# Patient Record
Sex: FEMALE | Race: WHITE | NOT HISPANIC OR LATINO | Employment: UNEMPLOYED | URBAN - METROPOLITAN AREA
[De-identification: names, ages, dates, MRNs, and addresses within clinical notes are randomized per-mention and may not be internally consistent; named-entity substitution may affect disease eponyms.]

---

## 2023-03-23 ENCOUNTER — TELEPHONE (OUTPATIENT)
Dept: PRIMARY CARE | Facility: CLINIC | Age: 78
End: 2023-03-23
Payer: MEDICARE

## 2023-03-23 NOTE — TELEPHONE ENCOUNTER
Pt informed of results.  Does not want to take Alendronate at this time.    ----- Message from Juliana Rainey PA-C sent at 3/17/2023  5:18 PM EDT -----  Regarding: DEXA results  DEXA bone scan shows osteoporosis. Her FRAX 10 year fracture risk is elevated above normal. This qualifies her for treatment with prescription medication, Alendronate, which she would take weekly for a total of 5 years. This medication both treats and prevents osteoporosis. Please let me know if she is interested in this medication and I will call it into the pharmacy for her.   Regardless, she needs to take calcium 600mg + vitamin D 400U 2x daily and participate in weight bearing exercises as tolerated  Thanks

## 2023-06-05 ENCOUNTER — TELEPHONE (OUTPATIENT)
Dept: PRIMARY CARE | Facility: CLINIC | Age: 78
End: 2023-06-05
Payer: MEDICARE

## 2023-06-05 DIAGNOSIS — R35.0 URINARY FREQUENCY: Primary | ICD-10-CM

## 2023-06-06 ENCOUNTER — LAB (OUTPATIENT)
Dept: LAB | Facility: LAB | Age: 78
End: 2023-06-06
Payer: MEDICARE

## 2023-06-06 DIAGNOSIS — R35.0 URINARY FREQUENCY: ICD-10-CM

## 2023-06-06 LAB
APPEARANCE, URINE: CLEAR
BILIRUBIN, URINE: NEGATIVE
BLOOD, URINE: NEGATIVE
COLOR, URINE: YELLOW
GLUCOSE, URINE: NEGATIVE MG/DL
KETONES, URINE: NEGATIVE MG/DL
LEUKOCYTE ESTERASE, URINE: NEGATIVE
NITRITE, URINE: NEGATIVE
PH, URINE: 5 (ref 5–8)
PROTEIN, URINE: NEGATIVE MG/DL
SPECIFIC GRAVITY, URINE: 1.02 (ref 1–1.03)
UROBILINOGEN, URINE: <2 MG/DL (ref 0–1.9)

## 2023-06-06 PROCEDURE — 81003 URINALYSIS AUTO W/O SCOPE: CPT

## 2023-06-06 PROCEDURE — 87086 URINE CULTURE/COLONY COUNT: CPT

## 2023-06-07 LAB — URINE CULTURE: NORMAL

## 2023-06-08 ENCOUNTER — TELEPHONE (OUTPATIENT)
Dept: PRIMARY CARE | Facility: CLINIC | Age: 78
End: 2023-06-08
Payer: MEDICARE

## 2023-06-08 NOTE — TELEPHONE ENCOUNTER
----- Message from Juliana Rainey PA-C sent at 6/7/2023 10:21 AM EDT -----  Urine was negative for UTI. No antibiotics needed. Drink adequate water.

## 2023-08-22 LAB
ALANINE AMINOTRANSFERASE (SGPT) (U/L) IN SER/PLAS: 16 U/L (ref 7–45)
ALBUMIN (G/DL) IN SER/PLAS: 4.3 G/DL (ref 3.4–5)
ALKALINE PHOSPHATASE (U/L) IN SER/PLAS: 67 U/L (ref 33–136)
ANION GAP IN SER/PLAS: 13 MMOL/L (ref 10–20)
ASPARTATE AMINOTRANSFERASE (SGOT) (U/L) IN SER/PLAS: 16 U/L (ref 9–39)
BASOPHILS (10*3/UL) IN BLOOD BY AUTOMATED COUNT: 0.03 X10E9/L (ref 0–0.1)
BASOPHILS/100 LEUKOCYTES IN BLOOD BY AUTOMATED COUNT: 0.8 % (ref 0–2)
BILIRUBIN TOTAL (MG/DL) IN SER/PLAS: 0.8 MG/DL (ref 0–1.2)
CALCIDIOL (25 OH VITAMIN D3) (NG/ML) IN SER/PLAS: 75 NG/ML
CALCIUM (MG/DL) IN SER/PLAS: 10 MG/DL (ref 8.6–10.6)
CARBON DIOXIDE, TOTAL (MMOL/L) IN SER/PLAS: 28 MMOL/L (ref 21–32)
CHLORIDE (MMOL/L) IN SER/PLAS: 105 MMOL/L (ref 98–107)
CHOLESTEROL (MG/DL) IN SER/PLAS: 173 MG/DL (ref 0–199)
CHOLESTEROL IN HDL (MG/DL) IN SER/PLAS: 62.4 MG/DL
CHOLESTEROL/HDL RATIO: 2.8
CREATININE (MG/DL) IN SER/PLAS: 0.79 MG/DL (ref 0.5–1.05)
EOSINOPHILS (10*3/UL) IN BLOOD BY AUTOMATED COUNT: 0.09 X10E9/L (ref 0–0.4)
EOSINOPHILS/100 LEUKOCYTES IN BLOOD BY AUTOMATED COUNT: 2.3 % (ref 0–6)
ERYTHROCYTE DISTRIBUTION WIDTH (RATIO) BY AUTOMATED COUNT: 12.5 % (ref 11.5–14.5)
ERYTHROCYTE MEAN CORPUSCULAR HEMOGLOBIN CONCENTRATION (G/DL) BY AUTOMATED: 32.7 G/DL (ref 32–36)
ERYTHROCYTE MEAN CORPUSCULAR VOLUME (FL) BY AUTOMATED COUNT: 91 FL (ref 80–100)
ERYTHROCYTES (10*6/UL) IN BLOOD BY AUTOMATED COUNT: 4.4 X10E12/L (ref 4–5.2)
ESTIMATED AVERAGE GLUCOSE FOR HBA1C: 111 MG/DL
GFR FEMALE: 77 ML/MIN/1.73M2
GLUCOSE (MG/DL) IN SER/PLAS: 87 MG/DL (ref 74–99)
HEMATOCRIT (%) IN BLOOD BY AUTOMATED COUNT: 40.1 % (ref 36–46)
HEMOGLOBIN (G/DL) IN BLOOD: 13.1 G/DL (ref 12–16)
HEMOGLOBIN A1C/HEMOGLOBIN TOTAL IN BLOOD: 5.5 %
IMMATURE GRANULOCYTES/100 LEUKOCYTES IN BLOOD BY AUTOMATED COUNT: 0.3 % (ref 0–0.9)
LDL: 91 MG/DL (ref 0–99)
LEUKOCYTES (10*3/UL) IN BLOOD BY AUTOMATED COUNT: 3.9 X10E9/L (ref 4.4–11.3)
LYMPHOCYTES (10*3/UL) IN BLOOD BY AUTOMATED COUNT: 0.72 X10E9/L (ref 0.8–3)
LYMPHOCYTES/100 LEUKOCYTES IN BLOOD BY AUTOMATED COUNT: 18.4 % (ref 13–44)
MONOCYTES (10*3/UL) IN BLOOD BY AUTOMATED COUNT: 0.36 X10E9/L (ref 0.05–0.8)
MONOCYTES/100 LEUKOCYTES IN BLOOD BY AUTOMATED COUNT: 9.2 % (ref 2–10)
NEUTROPHILS (10*3/UL) IN BLOOD BY AUTOMATED COUNT: 2.71 X10E9/L (ref 1.6–5.5)
NEUTROPHILS/100 LEUKOCYTES IN BLOOD BY AUTOMATED COUNT: 69 % (ref 40–80)
NRBC (PER 100 WBCS) BY AUTOMATED COUNT: 0 /100 WBC (ref 0–0)
PLATELETS (10*3/UL) IN BLOOD AUTOMATED COUNT: 167 X10E9/L (ref 150–450)
POTASSIUM (MMOL/L) IN SER/PLAS: 4.5 MMOL/L (ref 3.5–5.3)
PROTEIN TOTAL: 7.3 G/DL (ref 6.4–8.2)
SODIUM (MMOL/L) IN SER/PLAS: 141 MMOL/L (ref 136–145)
THYROTROPIN (MIU/L) IN SER/PLAS BY DETECTION LIMIT <= 0.05 MIU/L: 2.64 MIU/L (ref 0.44–3.98)
TRIGLYCERIDE (MG/DL) IN SER/PLAS: 100 MG/DL (ref 0–149)
UREA NITROGEN (MG/DL) IN SER/PLAS: 18 MG/DL (ref 6–23)
VLDL: 20 MG/DL (ref 0–40)

## 2023-08-28 ENCOUNTER — OFFICE VISIT (OUTPATIENT)
Dept: PRIMARY CARE | Facility: CLINIC | Age: 78
End: 2023-08-28
Payer: MEDICARE

## 2023-08-28 VITALS
BODY MASS INDEX: 17.81 KG/M2 | OXYGEN SATURATION: 97 % | SYSTOLIC BLOOD PRESSURE: 116 MMHG | HEART RATE: 61 BPM | DIASTOLIC BLOOD PRESSURE: 75 MMHG | WEIGHT: 107 LBS

## 2023-08-28 DIAGNOSIS — I42.9 CARDIOMYOPATHY, UNSPECIFIED TYPE (MULTI): ICD-10-CM

## 2023-08-28 DIAGNOSIS — D72.819 CHRONIC LEUKOPENIA: ICD-10-CM

## 2023-08-28 DIAGNOSIS — E21.0 PRIMARY HYPERPARATHYROIDISM (MULTI): ICD-10-CM

## 2023-08-28 DIAGNOSIS — G89.29 CHRONIC PAIN OF LEFT KNEE: Primary | ICD-10-CM

## 2023-08-28 DIAGNOSIS — M25.562 CHRONIC PAIN OF LEFT KNEE: Primary | ICD-10-CM

## 2023-08-28 PROBLEM — M25.512 LEFT SHOULDER PAIN: Status: ACTIVE | Noted: 2023-08-28

## 2023-08-28 PROBLEM — R94.31 ABNORMAL EKG: Status: ACTIVE | Noted: 2023-08-28

## 2023-08-28 PROBLEM — L03.90 CELLULITIS: Status: ACTIVE | Noted: 2023-08-28

## 2023-08-28 PROBLEM — M77.42 METATARSALGIA OF BOTH FEET: Status: ACTIVE | Noted: 2023-08-28

## 2023-08-28 PROBLEM — R53.83 FATIGUE: Status: ACTIVE | Noted: 2023-08-28

## 2023-08-28 PROBLEM — S93.409A ANKLE SPRAIN: Status: ACTIVE | Noted: 2023-08-28

## 2023-08-28 PROBLEM — M17.10 ARTHRITIS OF KNEE: Status: ACTIVE | Noted: 2023-08-28

## 2023-08-28 PROBLEM — E89.2 S/P PARATHYROIDECTOMY (CMS/HCC): Status: ACTIVE | Noted: 2023-08-28

## 2023-08-28 PROBLEM — S22.39XA FRACTURE OF RIB, SINGLE, CLOSED: Status: ACTIVE | Noted: 2023-08-28

## 2023-08-28 PROBLEM — R50.9 FEVER: Status: ACTIVE | Noted: 2023-08-28

## 2023-08-28 PROBLEM — E83.52 HYPERCALCEMIA: Status: ACTIVE | Noted: 2023-08-28

## 2023-08-28 PROBLEM — N95.1 POSTMENOPAUSAL DISORDER: Status: ACTIVE | Noted: 2023-08-28

## 2023-08-28 PROBLEM — H43.812 PVD (POSTERIOR VITREOUS DETACHMENT), LEFT EYE: Status: ACTIVE | Noted: 2023-08-28

## 2023-08-28 PROBLEM — H04.123 DRY EYES, BILATERAL: Status: ACTIVE | Noted: 2023-08-28

## 2023-08-28 PROBLEM — E78.5 HYPERLIPIDEMIA: Status: ACTIVE | Noted: 2023-08-28

## 2023-08-28 PROBLEM — H43.811 PVD (POSTERIOR VITREOUS DETACHMENT), RIGHT EYE: Status: ACTIVE | Noted: 2023-08-28

## 2023-08-28 PROBLEM — M81.0 OSTEOPOROSIS: Status: ACTIVE | Noted: 2023-08-28

## 2023-08-28 PROBLEM — I49.3 PVC (PREMATURE VENTRICULAR CONTRACTION): Status: ACTIVE | Noted: 2023-08-28

## 2023-08-28 PROBLEM — H25.811 COMBINED FORM OF AGE-RELATED CATARACT, RIGHT EYE: Status: ACTIVE | Noted: 2023-08-28

## 2023-08-28 PROBLEM — M54.2 NECK PAIN: Status: ACTIVE | Noted: 2023-08-28

## 2023-08-28 PROBLEM — R79.9 ABNORMAL BLOOD CHEMISTRY: Status: ACTIVE | Noted: 2023-08-28

## 2023-08-28 PROBLEM — M54.9 BACK PAIN: Status: ACTIVE | Noted: 2023-08-28

## 2023-08-28 PROBLEM — M25.579 ANKLE PAIN: Status: ACTIVE | Noted: 2023-08-28

## 2023-08-28 PROBLEM — M62.838 MUSCLE SPASM: Status: ACTIVE | Noted: 2023-08-28

## 2023-08-28 PROBLEM — L03.90 CELLULITIS: Status: RESOLVED | Noted: 2023-08-28 | Resolved: 2023-08-28

## 2023-08-28 PROBLEM — M20.10 VALGUS DEFORMITY OF GREAT TOE: Status: ACTIVE | Noted: 2023-08-28

## 2023-08-28 PROBLEM — M77.41 METATARSALGIA OF BOTH FEET: Status: ACTIVE | Noted: 2023-08-28

## 2023-08-28 PROBLEM — M54.16 LUMBAR RADICULITIS: Status: ACTIVE | Noted: 2023-08-28

## 2023-08-28 PROBLEM — H91.90 HEARING LOSS: Status: ACTIVE | Noted: 2023-08-28

## 2023-08-28 PROBLEM — H25.812 COMBINED FORM OF AGE-RELATED CATARACT, LEFT EYE: Status: ACTIVE | Noted: 2023-08-28

## 2023-08-28 PROBLEM — H52.00 HYPEROPIA WITH PRESBYOPIA: Status: ACTIVE | Noted: 2023-08-28

## 2023-08-28 PROBLEM — M65.312 TRIGGER FINGER OF LEFT THUMB: Status: ACTIVE | Noted: 2023-08-28

## 2023-08-28 PROBLEM — H01.003 BLEPHARITIS OF BOTH EYES: Status: ACTIVE | Noted: 2023-08-28

## 2023-08-28 PROBLEM — H52.4 HYPEROPIA WITH PRESBYOPIA: Status: ACTIVE | Noted: 2023-08-28

## 2023-08-28 PROBLEM — H01.006 BLEPHARITIS OF BOTH EYES: Status: ACTIVE | Noted: 2023-08-28

## 2023-08-28 PROCEDURE — 1159F MED LIST DOCD IN RCRD: CPT | Performed by: PHYSICIAN ASSISTANT

## 2023-08-28 PROCEDURE — 1036F TOBACCO NON-USER: CPT | Performed by: PHYSICIAN ASSISTANT

## 2023-08-28 PROCEDURE — 1160F RVW MEDS BY RX/DR IN RCRD: CPT | Performed by: PHYSICIAN ASSISTANT

## 2023-08-28 PROCEDURE — 99213 OFFICE O/P EST LOW 20 MIN: CPT | Performed by: PHYSICIAN ASSISTANT

## 2023-08-28 RX ORDER — ATORVASTATIN CALCIUM 10 MG/1
10 TABLET, FILM COATED ORAL DAILY
COMMUNITY
End: 2024-02-12 | Stop reason: SDUPTHER

## 2023-08-28 RX ORDER — IBUPROFEN 200 MG
TABLET ORAL EVERY 6 HOURS
COMMUNITY

## 2023-08-28 RX ORDER — ASPIRIN 81 MG/1
TABLET ORAL
COMMUNITY

## 2023-08-28 RX ORDER — ASCORBIC ACID
CRYSTALS ORAL
COMMUNITY

## 2023-08-28 RX ORDER — SULFAMETHOXAZOLE AND TRIMETHOPRIM 800; 160 MG/1; MG/1
TABLET ORAL EVERY 12 HOURS
COMMUNITY
Start: 2014-05-21 | End: 2023-08-28 | Stop reason: ALTCHOICE

## 2023-08-28 RX ORDER — MULTIVIT WITH IRON,MINERALS
TABLET ORAL
COMMUNITY
End: 2024-02-12 | Stop reason: ALTCHOICE

## 2023-08-28 RX ORDER — MULTIVITAMIN
TABLET ORAL
COMMUNITY

## 2023-08-28 ASSESSMENT — PATIENT HEALTH QUESTIONNAIRE - PHQ9
SUM OF ALL RESPONSES TO PHQ9 QUESTIONS 1 AND 2: 0
2. FEELING DOWN, DEPRESSED OR HOPELESS: NOT AT ALL
1. LITTLE INTEREST OR PLEASURE IN DOING THINGS: NOT AT ALL

## 2023-08-28 NOTE — ASSESSMENT & PLAN NOTE
S/p physical therapy with little to no relief.  RICE therapy encouraged.  Take Tylenol arthritis as needed for pain.  Referral to orthopedics placed for further management.

## 2023-08-28 NOTE — ASSESSMENT & PLAN NOTE
Stable on current meds.  Follows with cardiology.  Follow heart healthy lifestyle through diet and exercise.

## 2023-08-28 NOTE — PROGRESS NOTES
"Subjective   Lydia Limon is a 78 y.o. female who presents for Follow-up.  HPI Lydia Limon is a 78 y.o. female presenting for a follow up. Generally doing well. Currently c/o:    Chronic L knee pain: Recall XR 2021 showed \"severe medial and mild lateral/patellofemoral compartment osteoarthrosis. Moderate knee joint effusion. \" She is s/p completion of physical therapy, felt little to no benefit. She is interested in meeting with Orthopedics to discuss next step treatment.     HLD, cardiomyopathy: stable on 81mg ASA, atorvastatin 10mg, and niacin 100mg  Follows with cardiology. Denies HA, dizziness, chest pain, SOB/TAN, LE edema    Osteoporosis: DEXA 3/2023 showed osteoporosis. Refuses bisphosphonate therapy.     Chronic leukopenia, lymphopenia: has never been evaluated by hematology. No recurrent or chronic infections    Health maintenance:  Immunizations:  -Flu: refused  -Pneumococcal: Patient is contemplating, deferred.   -Shingrix: refused  Mammogram w/ jm UTD (last 5/23/23) - ordered next   Colon CA screening - refused   DEXA DUE (last 3/15/23) - refused bisphosphonate    Last MCR: 2/20/23 (plain MCR)    12 point ROS reviewed and negative other than as stated in HPI    /75   Pulse 61   Wt 48.5 kg (107 lb)   SpO2 97%   BMI 17.81 kg/m²   Objective   Physical Exam  Vitals reviewed.   Constitutional:       General: She is not in acute distress.     Appearance: Normal appearance. She is not ill-appearing.   HENT:      Head: Normocephalic and atraumatic.   Eyes:      General: No scleral icterus.     Extraocular Movements: Extraocular movements intact.      Conjunctiva/sclera: Conjunctivae normal.      Pupils: Pupils are equal, round, and reactive to light.   Cardiovascular:      Rate and Rhythm: Normal rate and regular rhythm.      Heart sounds: Normal heart sounds. No murmur heard.     No friction rub. No gallop.   Pulmonary:      Effort: Pulmonary effort is normal. No respiratory distress. "      Breath sounds: Normal breath sounds. No stridor. No wheezing, rhonchi or rales.   Musculoskeletal:      Cervical back: Normal range of motion.      Right lower leg: No edema.      Left lower leg: No edema.   Skin:     General: Skin is warm and dry.   Neurological:      Mental Status: She is alert and oriented to person, place, and time. Mental status is at baseline.      Cranial Nerves: No cranial nerve deficit.      Gait: Gait normal.   Psychiatric:         Mood and Affect: Mood normal.         Behavior: Behavior normal.         Assessment/Plan   Problem List Items Addressed This Visit       Cardiomyopathy (CMS/HCC)     Stable on current meds.  Follows with cardiology.  Follow heart healthy lifestyle through diet and exercise.         Left knee pain - Primary     S/p physical therapy with little to no relief.  RICE therapy encouraged.  Take Tylenol arthritis as needed for pain.  Referral to orthopedics placed for further management.         Relevant Orders    Referral to Orthopaedic Surgery    Primary hyperparathyroidism (CMS/HCC)     S/p parathyroidectomy 2019.          Chronic leukopenia     Without any evidence of infection.  Referral to benign hematology placed for further work-up         Relevant Orders    Referral to Hematology        Follow-up in 6 months for Medicare wellness or sooner as needed

## 2023-11-16 ENCOUNTER — ANCILLARY PROCEDURE (OUTPATIENT)
Dept: RADIOLOGY | Facility: CLINIC | Age: 78
End: 2023-11-16
Payer: MEDICARE

## 2023-11-16 ENCOUNTER — OFFICE VISIT (OUTPATIENT)
Dept: ORTHOPEDIC SURGERY | Facility: CLINIC | Age: 78
End: 2023-11-16
Payer: MEDICARE

## 2023-11-16 DIAGNOSIS — M25.562 LEFT KNEE PAIN, UNSPECIFIED CHRONICITY: ICD-10-CM

## 2023-11-16 DIAGNOSIS — M17.12 PRIMARY OSTEOARTHRITIS OF LEFT KNEE: Primary | ICD-10-CM

## 2023-11-16 PROCEDURE — 1125F AMNT PAIN NOTED PAIN PRSNT: CPT | Performed by: ORTHOPAEDIC SURGERY

## 2023-11-16 PROCEDURE — 99203 OFFICE O/P NEW LOW 30 MIN: CPT | Performed by: ORTHOPAEDIC SURGERY

## 2023-11-16 PROCEDURE — 2500000005 HC RX 250 GENERAL PHARMACY W/O HCPCS: Performed by: ORTHOPAEDIC SURGERY

## 2023-11-16 PROCEDURE — 99213 OFFICE O/P EST LOW 20 MIN: CPT | Performed by: ORTHOPAEDIC SURGERY

## 2023-11-16 PROCEDURE — 1160F RVW MEDS BY RX/DR IN RCRD: CPT | Performed by: ORTHOPAEDIC SURGERY

## 2023-11-16 PROCEDURE — 2500000004 HC RX 250 GENERAL PHARMACY W/ HCPCS (ALT 636 FOR OP/ED): Performed by: ORTHOPAEDIC SURGERY

## 2023-11-16 PROCEDURE — 73564 X-RAY EXAM KNEE 4 OR MORE: CPT | Mod: LEFT SIDE | Performed by: STUDENT IN AN ORGANIZED HEALTH CARE EDUCATION/TRAINING PROGRAM

## 2023-11-16 PROCEDURE — 73564 X-RAY EXAM KNEE 4 OR MORE: CPT | Mod: LT,FY

## 2023-11-16 PROCEDURE — 1159F MED LIST DOCD IN RCRD: CPT | Performed by: ORTHOPAEDIC SURGERY

## 2023-11-16 PROCEDURE — 20610 DRAIN/INJ JOINT/BURSA W/O US: CPT | Performed by: ORTHOPAEDIC SURGERY

## 2023-11-16 RX ORDER — LIDOCAINE HYDROCHLORIDE 10 MG/ML
4 INJECTION INFILTRATION; PERINEURAL
Status: COMPLETED | OUTPATIENT
Start: 2023-11-16 | End: 2023-11-16

## 2023-11-16 RX ORDER — TRIAMCINOLONE ACETONIDE 40 MG/ML
1 INJECTION, SUSPENSION INTRA-ARTICULAR; INTRAMUSCULAR
Status: COMPLETED | OUTPATIENT
Start: 2023-11-16 | End: 2023-11-16

## 2023-11-16 RX ADMIN — TRIAMCINOLONE ACETONIDE 1 ML: 40 INJECTION, SUSPENSION INTRA-ARTICULAR; INTRAMUSCULAR at 12:40

## 2023-11-16 RX ADMIN — LIDOCAINE HYDROCHLORIDE 4 ML: 10 INJECTION, SOLUTION INFILTRATION; PERINEURAL at 12:40

## 2023-11-16 ASSESSMENT — PAIN SCALES - GENERAL: PAINLEVEL_OUTOF10: 3

## 2023-11-16 ASSESSMENT — PAIN - FUNCTIONAL ASSESSMENT: PAIN_FUNCTIONAL_ASSESSMENT: 0-10

## 2023-11-16 NOTE — PROGRESS NOTES
Patient is a pleasant 78-year-old female presents today for evaluation of left knee osteoarthritis.  She had previous physical therapy.  She takes Advil.  She is difficulty going down stairs or walking certain distance.  She is never any injections or previous surgery in the knee.    Left knee: AAOx3, NAD, walks with a [degree] antalgic gait  Varus allignment  Range of motion lacks 5 degrees of full extension and flexes to 110 degrees  Stable to varus/valgus/anterior/posterior stress through out the range of motion  Slight laxity with varus stress  Diffuse medial  joint line tenderness to palpation  Moderate effusion  SILT in a allison/saph/per/tib distribution  5/5 knee extension/df/pf/ehl  ½ dorsalis pedis and posterior tibial pulse  no popliteal lymphadenopathy  no other overlying lesions  mood: euthymic  Respirations non labored    Plain films were reviewed by myself in clinic today.  She is advanced osteoarthritis of her left knee evidenced by significant medial joint space narrowing, underlying sclerosis and tricompartmental osteophytic change.    We discussed further conservative treatment with her today in clinic.  She may benefit from total knee replacement the future.  She elected undergo a cortisone injection today in clinic.  She can continue to take over-the-counter anti-inflammatories.  We will see her back in 1 month and see how she is doing.  All of her questions were answered.    L Inj/Asp: L knee on 11/16/2023 12:40 PM  Indications: pain and joint swelling  Details: 22 G needle, anterolateral approach  Medications: 4 mL lidocaine 10 mg/mL (1 %); 1 mL triamcinolone acetonide 40 mg/mL    Discussion:  I discussed the conservative treatment options for knee osteoarthritis including but not limited to physical therapy, oral NSAIDS, activity and lifestyle modification, and corticosteroid injections. Pt has elected to undergo a cortisone injection today. I have explained the risk and benefits of an injection  including the possibility of joint infection, bleeding, damage to cartilage, allergic reaction. Patient verbalized understanding and gave verbal consent wishes to proceed with a intra-articular cortisone injection for their knee.    Procedure:  After discussing the risk and benefits of the procedure, we proceeded with an intra-articular left knee injection.    With the patient's informed verbal consent, the left knee was prepped in standard sterile fashion with Chlorhexidine. The skin was then anesthetized with ethyl chloride spray and cleaned again with Chlorhexidine. The knee was then apirated/injected with a prefilled 20-gauge syringe of 40 mg Kenalog + 4 ml Lidocaine using the lateral approach without complications.  The patient tolerated this well and felt immediate initial relief of symptoms. A bandaid was applied and the patient ambulated out of the clinic on ther own accord without difficulty. Patient was instructed to avoid physical activity for 24-48 hours to prevent the knees from swelling and may ice the knees as tolerated. Patient should contact the office if any signs of of infection appear: redness, fever, chills, drainage, swelling or warmth to the knees.  Pt understands that the injections can be repeated no sooner than 3 months.  Procedure, treatment alternatives, risks and benefits explained, specific risks discussed. Consent was given by the patient. Immediately prior to procedure a time out was called to verify the correct patient, procedure, equipment, support staff and site/side marked as required. Patient was prepped and draped in the usual sterile fashion.

## 2024-02-12 ENCOUNTER — OFFICE VISIT (OUTPATIENT)
Dept: CARDIOLOGY | Facility: CLINIC | Age: 79
End: 2024-02-12
Payer: MEDICARE

## 2024-02-12 ENCOUNTER — HOSPITAL ENCOUNTER (OUTPATIENT)
Dept: CARDIOLOGY | Facility: CLINIC | Age: 79
Discharge: HOME | End: 2024-02-12
Payer: MEDICARE

## 2024-02-12 VITALS
HEIGHT: 65 IN | OXYGEN SATURATION: 100 % | DIASTOLIC BLOOD PRESSURE: 80 MMHG | WEIGHT: 109.8 LBS | BODY MASS INDEX: 18.29 KG/M2 | SYSTOLIC BLOOD PRESSURE: 145 MMHG | HEART RATE: 64 BPM

## 2024-02-12 DIAGNOSIS — I42.9 PRIMARY CARDIOMYOPATHY (MULTI): Primary | ICD-10-CM

## 2024-02-12 DIAGNOSIS — E78.5 HYPERLIPIDEMIA, UNSPECIFIED HYPERLIPIDEMIA TYPE: ICD-10-CM

## 2024-02-12 LAB
ATRIAL RATE: 64 BPM
P AXIS: 76 DEGREES
P OFFSET: 200 MS
P ONSET: 139 MS
PR INTERVAL: 162 MS
Q ONSET: 220 MS
QRS COUNT: 10 BEATS
QRS DURATION: 104 MS
QT INTERVAL: 394 MS
QTC CALCULATION(BAZETT): 406 MS
QTC FREDERICIA: 402 MS
R AXIS: 24 DEGREES
T AXIS: 51 DEGREES
T OFFSET: 417 MS
VENTRICULAR RATE: 64 BPM

## 2024-02-12 PROCEDURE — 1126F AMNT PAIN NOTED NONE PRSNT: CPT | Performed by: INTERNAL MEDICINE

## 2024-02-12 PROCEDURE — 93005 ELECTROCARDIOGRAM TRACING: CPT

## 2024-02-12 PROCEDURE — 1159F MED LIST DOCD IN RCRD: CPT | Performed by: INTERNAL MEDICINE

## 2024-02-12 PROCEDURE — 1160F RVW MEDS BY RX/DR IN RCRD: CPT | Performed by: INTERNAL MEDICINE

## 2024-02-12 PROCEDURE — 93010 ELECTROCARDIOGRAM REPORT: CPT | Performed by: INTERNAL MEDICINE

## 2024-02-12 PROCEDURE — 1036F TOBACCO NON-USER: CPT | Performed by: INTERNAL MEDICINE

## 2024-02-12 PROCEDURE — 99213 OFFICE O/P EST LOW 20 MIN: CPT | Performed by: INTERNAL MEDICINE

## 2024-02-12 RX ORDER — ATORVASTATIN CALCIUM 20 MG/1
20 TABLET, FILM COATED ORAL DAILY
Qty: 90 TABLET | Refills: 3 | Status: SHIPPED | OUTPATIENT
Start: 2024-02-12 | End: 2024-02-14 | Stop reason: SDUPTHER

## 2024-02-12 RX ORDER — VITAMIN E MIXED 400 UNIT
400 CAPSULE ORAL DAILY
COMMUNITY

## 2024-02-12 RX ORDER — CALCIUM CARBONATE/VITAMIN D3 600 MG-20
2 TABLET,CHEWABLE ORAL DAILY
COMMUNITY

## 2024-02-12 ASSESSMENT — ENCOUNTER SYMPTOMS
OCCASIONAL FEELINGS OF UNSTEADINESS: 0
LOSS OF SENSATION IN FEET: 0
DEPRESSION: 0

## 2024-02-12 ASSESSMENT — PATIENT HEALTH QUESTIONNAIRE - PHQ9
SUM OF ALL RESPONSES TO PHQ9 QUESTIONS 1 AND 2: 0
1. LITTLE INTEREST OR PLEASURE IN DOING THINGS: NOT AT ALL
2. FEELING DOWN, DEPRESSED OR HOPELESS: NOT AT ALL

## 2024-02-12 ASSESSMENT — PAIN SCALES - GENERAL: PAINLEVEL: 0-NO PAIN

## 2024-02-12 NOTE — PROGRESS NOTES
Subjective:  Patient returns for a routine annual follow-up.  She generally remains asymptomatic from a cardiac standpoint.  Her blood pressure readings at home have all been excellent.  She remains compliant with her low-dose statin therapy.  She has not had any hospitalizations and denies any other new health concerns.    Objective:  General: Alert, usual slender self.  HEENT: Unchanged.  Lungs: Clear without crackles or wheezing.  Cardiac: Normal S1 and S2 without murmur rub or S3.  Abdomen: Nontender.  Extremities: No edema.  Skin: No rash.  Neuro: Grossly intact and unchanged.    EKG: Normal sinus rhythm.  LVH.  Nonspecific ST abnormality.    Lipid panel: Cholesterol-173, HDL-62, LDL-91, TG-100.    Impression/plan:  Lydia is doing quite nicely at this time.  She remains asymptomatic cardiac wise.  I did encourage her to escalate her atorvastatin dosing to 20 mg daily to see if we could get her LDL a bit better given the severity of her baseline LDL elevation.  She will give this a try and let me know if she tolerates it.  I will recheck her lipids in 3 months and make further recommendations at that time.  She knows to call for any intercurrent concerns.  I did not think we needed to embark on any other testing at this time.    Patient instructions:    Increase your atorvastatin to 20 mg daily and obtain repeat lipids in 3 months.  Call for results.    Return to clinic in 1 year.

## 2024-02-14 ENCOUNTER — TELEPHONE (OUTPATIENT)
Dept: CARDIOLOGY | Facility: CLINIC | Age: 79
End: 2024-02-14
Payer: MEDICARE

## 2024-02-14 DIAGNOSIS — E78.5 HYPERLIPIDEMIA, UNSPECIFIED HYPERLIPIDEMIA TYPE: Primary | ICD-10-CM

## 2024-02-14 RX ORDER — ATORVASTATIN CALCIUM 10 MG/1
10 TABLET, FILM COATED ORAL DAILY
Qty: 90 TABLET | Refills: 3 | Status: SHIPPED | OUTPATIENT
Start: 2024-02-14 | End: 2024-02-15 | Stop reason: SDUPTHER

## 2024-02-15 DIAGNOSIS — E78.5 HYPERLIPIDEMIA, UNSPECIFIED HYPERLIPIDEMIA TYPE: ICD-10-CM

## 2024-02-15 RX ORDER — ATORVASTATIN CALCIUM 10 MG/1
10 TABLET, FILM COATED ORAL DAILY
Qty: 90 TABLET | Refills: 3 | Status: SHIPPED | OUTPATIENT
Start: 2024-02-15 | End: 2025-02-14

## 2024-02-16 ENCOUNTER — OFFICE VISIT (OUTPATIENT)
Dept: ORTHOPEDIC SURGERY | Facility: CLINIC | Age: 79
End: 2024-02-16
Payer: MEDICARE

## 2024-02-16 DIAGNOSIS — M25.562 LEFT KNEE PAIN, UNSPECIFIED CHRONICITY: ICD-10-CM

## 2024-02-16 DIAGNOSIS — M17.0 PRIMARY OSTEOARTHRITIS OF BOTH KNEES: Primary | ICD-10-CM

## 2024-02-16 DIAGNOSIS — M25.561 RIGHT KNEE PAIN, UNSPECIFIED CHRONICITY: ICD-10-CM

## 2024-02-16 PROCEDURE — 1159F MED LIST DOCD IN RCRD: CPT | Performed by: ORTHOPAEDIC SURGERY

## 2024-02-16 PROCEDURE — 1126F AMNT PAIN NOTED NONE PRSNT: CPT | Performed by: ORTHOPAEDIC SURGERY

## 2024-02-16 PROCEDURE — 1160F RVW MEDS BY RX/DR IN RCRD: CPT | Performed by: ORTHOPAEDIC SURGERY

## 2024-02-16 PROCEDURE — 20610 DRAIN/INJ JOINT/BURSA W/O US: CPT | Mod: 50 | Performed by: ORTHOPAEDIC SURGERY

## 2024-02-16 PROCEDURE — 20610 DRAIN/INJ JOINT/BURSA W/O US: CPT | Performed by: ORTHOPAEDIC SURGERY

## 2024-02-16 PROCEDURE — 1036F TOBACCO NON-USER: CPT | Performed by: ORTHOPAEDIC SURGERY

## 2024-02-16 PROCEDURE — 2500000004 HC RX 250 GENERAL PHARMACY W/ HCPCS (ALT 636 FOR OP/ED): Performed by: ORTHOPAEDIC SURGERY

## 2024-02-16 PROCEDURE — 2500000005 HC RX 250 GENERAL PHARMACY W/O HCPCS: Performed by: ORTHOPAEDIC SURGERY

## 2024-02-16 RX ORDER — LIDOCAINE HYDROCHLORIDE 10 MG/ML
4 INJECTION INFILTRATION; PERINEURAL
Status: COMPLETED | OUTPATIENT
Start: 2024-02-16 | End: 2024-02-16

## 2024-02-16 RX ORDER — TRIAMCINOLONE ACETONIDE 40 MG/ML
1 INJECTION, SUSPENSION INTRA-ARTICULAR; INTRAMUSCULAR
Status: COMPLETED | OUTPATIENT
Start: 2024-02-16 | End: 2024-02-16

## 2024-02-16 RX ADMIN — LIDOCAINE HYDROCHLORIDE 4 ML: 10 INJECTION, SOLUTION INFILTRATION; PERINEURAL at 13:58

## 2024-02-16 RX ADMIN — TRIAMCINOLONE ACETONIDE 1 ML: 40 INJECTION, SUSPENSION INTRA-ARTICULAR; INTRAMUSCULAR at 13:58

## 2024-02-16 ASSESSMENT — PAIN SCALES - GENERAL: PAINLEVEL_OUTOF10: 6

## 2024-02-16 ASSESSMENT — PAIN DESCRIPTION - DESCRIPTORS: DESCRIPTORS: ACHING;SHARP

## 2024-02-16 ASSESSMENT — PAIN - FUNCTIONAL ASSESSMENT: PAIN_FUNCTIONAL_ASSESSMENT: 0-10

## 2024-02-16 NOTE — PROGRESS NOTES
L Inj/Asp: bilateral knee on 2/16/2024 1:58 PM  Indications: pain and joint swelling  Details: 22 G needle, anterolateral approach  Medications (Right): 1 mL triamcinolone acetonide 40 mg/mL; 4 mL lidocaine 10 mg/mL (1 %)  Medications (Left): 1 mL triamcinolone acetonide 40 mg/mL; 4 mL lidocaine 10 mg/mL (1 %)    Discussion:  I discussed the conservative treatment options for knee osteoarthritis including but not limited to physical therapy, oral NSAIDS, activity and lifestyle modification, and corticosteroid injections. Pt has elected to undergo a cortisone injection today. I have explained the risk and benefits of an injection including the possibility of joint infection, bleeding, damage to cartilage, allergic reaction. Patient verbalized understanding and gave verbal consent wishes to proceed with a intra-articular cortisone injection for their knee.    Procedure:  After discussing the risk and benefits of the procedure, we proceeded with an intra-articular bilateral knee injection.    With the patient's informed verbal consent, the bilateral knees were prepped in standard sterile fashion with Chlorhexidine. The skin was then anesthetized with ethyl chloride spray and cleaned again with Chlorhexidine. The bilateral knees were then apirated/injected with a prefilled 20-gauge syringe of 40 mg Kenalog + 4 ml Lidocaine using the lateral approach without complications.  The patient tolerated this well and felt immediate initial relief of symptoms. A bandaid was applied and the patient ambulated out of the clinic on ther own accord without difficulty. Patient was instructed to avoid physical activity for 24-48 hours to prevent the knees from swelling and may ice the knees as tolerated. Patient should contact the office if any signs of of infection appear: redness, fever, chills, drainage, swelling or warmth to the knees.  Pt understands that the injections can be repeated no sooner than 3 months.      Procedure,  treatment alternatives, risks and benefits explained, specific risks discussed. Consent was given by the patient. Immediately prior to procedure a time out was called to verify the correct patient, procedure, equipment, support staff and site/side marked as required. Patient was prepped and draped in the usual sterile fashion.

## 2024-02-29 ENCOUNTER — OFFICE VISIT (OUTPATIENT)
Dept: PRIMARY CARE | Facility: CLINIC | Age: 79
End: 2024-02-29
Payer: MEDICARE

## 2024-02-29 VITALS
DIASTOLIC BLOOD PRESSURE: 74 MMHG | OXYGEN SATURATION: 99 % | BODY MASS INDEX: 18.14 KG/M2 | SYSTOLIC BLOOD PRESSURE: 131 MMHG | HEART RATE: 66 BPM | WEIGHT: 109 LBS

## 2024-02-29 DIAGNOSIS — Z13.89 ENCOUNTER FOR SCREENING FOR OTHER DISORDER: ICD-10-CM

## 2024-02-29 DIAGNOSIS — Z00.00 MEDICARE ANNUAL WELLNESS VISIT, SUBSEQUENT: Primary | ICD-10-CM

## 2024-02-29 DIAGNOSIS — M17.10 ARTHRITIS OF KNEE: ICD-10-CM

## 2024-02-29 DIAGNOSIS — Z12.31 ENCOUNTER FOR SCREENING MAMMOGRAM FOR BREAST CANCER: ICD-10-CM

## 2024-02-29 DIAGNOSIS — E78.5 HYPERLIPIDEMIA, UNSPECIFIED HYPERLIPIDEMIA TYPE: ICD-10-CM

## 2024-02-29 DIAGNOSIS — I42.9 CARDIOMYOPATHY, UNSPECIFIED TYPE (MULTI): ICD-10-CM

## 2024-02-29 PROBLEM — M25.579 ANKLE PAIN: Status: RESOLVED | Noted: 2023-08-28 | Resolved: 2024-02-29

## 2024-02-29 PROBLEM — S93.409A ANKLE SPRAIN: Status: RESOLVED | Noted: 2023-08-28 | Resolved: 2024-02-29

## 2024-02-29 PROBLEM — E21.0 PRIMARY HYPERPARATHYROIDISM (MULTI): Status: RESOLVED | Noted: 2023-08-28 | Resolved: 2024-02-29

## 2024-02-29 PROCEDURE — 99213 OFFICE O/P EST LOW 20 MIN: CPT | Performed by: PHYSICIAN ASSISTANT

## 2024-02-29 PROCEDURE — 1124F ACP DISCUSS-NO DSCNMKR DOCD: CPT | Performed by: PHYSICIAN ASSISTANT

## 2024-02-29 PROCEDURE — 1160F RVW MEDS BY RX/DR IN RCRD: CPT | Performed by: PHYSICIAN ASSISTANT

## 2024-02-29 PROCEDURE — 1159F MED LIST DOCD IN RCRD: CPT | Performed by: PHYSICIAN ASSISTANT

## 2024-02-29 PROCEDURE — 1126F AMNT PAIN NOTED NONE PRSNT: CPT | Performed by: PHYSICIAN ASSISTANT

## 2024-02-29 PROCEDURE — 1170F FXNL STATUS ASSESSED: CPT | Performed by: PHYSICIAN ASSISTANT

## 2024-02-29 PROCEDURE — G0439 PPPS, SUBSEQ VISIT: HCPCS | Performed by: PHYSICIAN ASSISTANT

## 2024-02-29 PROCEDURE — 1036F TOBACCO NON-USER: CPT | Performed by: PHYSICIAN ASSISTANT

## 2024-02-29 ASSESSMENT — PATIENT HEALTH QUESTIONNAIRE - PHQ9
2. FEELING DOWN, DEPRESSED OR HOPELESS: NOT AT ALL
SUM OF ALL RESPONSES TO PHQ9 QUESTIONS 1 AND 2: 0
1. LITTLE INTEREST OR PLEASURE IN DOING THINGS: NOT AT ALL

## 2024-02-29 ASSESSMENT — ENCOUNTER SYMPTOMS
LOSS OF SENSATION IN FEET: 0
OCCASIONAL FEELINGS OF UNSTEADINESS: 0
DEPRESSION: 0

## 2024-02-29 ASSESSMENT — ACTIVITIES OF DAILY LIVING (ADL)
GROCERY_SHOPPING: INDEPENDENT
MANAGING_FINANCES: INDEPENDENT
TAKING_MEDICATION: INDEPENDENT
DOING_HOUSEWORK: INDEPENDENT
BATHING: INDEPENDENT

## 2024-02-29 NOTE — ASSESSMENT & PLAN NOTE
Continue following with ortho for cortisone injections every 3 months. Take tylenol arthritis PRN for pain.

## 2024-02-29 NOTE — PROGRESS NOTES
Subjective   Lydia Limon is a 78 y.o. female who presents for a Medicare Annual Wellness exam. Generally doing well. No new complaints.     HLD, cardiomyopathy: stable on 81mg ASA, atorvastatin 10mg, and niacin 100mg  Does not check BP at home. Denies HA, dizziness, chest pain, SOB/TAN, LE edema. Follows with cardiology. ASCVD 10 yr risk 18.5%    Osteoporosis: DEXA 3/2023 showed osteoporosis. Refuses bisphosphonate therapy.     Chronic leukopenia, lymphopenia: No recurrent or chronic infections. Does not follow with hematology.     B/l knee pains: chronic. Follows with orthopedics. S/p b/l cortisone injections.     Health maintenance:  Immunizations:  -Flu: refused  -Pneumococcal: deferred by pt  -Shingrix: refused  Mammogram w/ jm UTD (last 5/23/23) - ordered next 2/29/24  Colon CA screening - refused   DEXA DUE (last 3/15/23) - refused bisphosphonate  Eye care: UTD, 2023. Wears glasses. Has cataracts   Dental care: UTD, 2023. In process of getting an implant   No healthcare POA/living will    Last MCR: 2/29/24 (plain MCR)    12 point ROS reviewed and negative other than as stated in HPI    Past Medical, Surgical, and Family History reviewed and updated in chart.    Reviewed all medications by prescribing practitioner or clinical pharmacist (such as prescriptions, OTCs, herbal therapies and supplements) and documented in the medical record.    /74   Pulse 66   Wt 49.4 kg (109 lb)   SpO2 99%   BMI 18.14 kg/m²   Objective   Physical Exam  Vitals reviewed.   Constitutional:       General: She is not in acute distress.     Appearance: Normal appearance.   HENT:      Head: Normocephalic and atraumatic.      Right Ear: Tympanic membrane, ear canal and external ear normal. There is no impacted cerumen.      Left Ear: Tympanic membrane, ear canal and external ear normal. There is no impacted cerumen.      Ears:      Comments: Right hearing aid     Nose: Nose normal. No congestion or rhinorrhea.       Mouth/Throat:      Mouth: Mucous membranes are moist.      Pharynx: Oropharynx is clear. No oropharyngeal exudate or posterior oropharyngeal erythema.   Eyes:      General: No scleral icterus.        Right eye: No discharge.         Left eye: No discharge.      Extraocular Movements: Extraocular movements intact.      Conjunctiva/sclera: Conjunctivae normal.      Pupils: Pupils are equal, round, and reactive to light.      Comments: Wears glasses    Cardiovascular:      Rate and Rhythm: Normal rate and regular rhythm.      Heart sounds: Normal heart sounds. No murmur heard.     No friction rub. No gallop.   Pulmonary:      Effort: Pulmonary effort is normal. No respiratory distress.      Breath sounds: Normal breath sounds. No stridor. No wheezing, rhonchi or rales.   Abdominal:      General: Bowel sounds are normal. There is no distension.      Palpations: Abdomen is soft. There is no mass.      Tenderness: There is no abdominal tenderness. There is no right CVA tenderness or left CVA tenderness.   Musculoskeletal:         General: Normal range of motion.      Cervical back: Normal range of motion and neck supple.      Right lower leg: No edema.      Left lower leg: No edema.   Skin:     General: Skin is warm and dry.      Findings: No rash.   Neurological:      General: No focal deficit present.      Mental Status: She is alert and oriented to person, place, and time. Mental status is at baseline.      Cranial Nerves: No cranial nerve deficit.      Gait: Gait normal.   Psychiatric:         Mood and Affect: Mood normal.         Behavior: Behavior normal.         Assessment/Plan   Problem List Items Addressed This Visit       Arthritis of knee     Continue following with ortho for cortisone injections every 3 months. Take tylenol arthritis PRN for pain.          Cardiomyopathy (CMS/HCC)     Stable on current meds.  Follows with cardiology.  Follow heart healthy lifestyle through diet and exercise.          Hyperlipidemia     Continue 81mg ASA, atorvastatin 10mg, and niacin 100mg. Follow a mediterranean style diet.          Medicare annual wellness visit, subsequent - Primary     Discussed age appropriate preventive health measures.          Encounter for screening mammogram for breast cancer     Mammogram ordered          Relevant Orders    BI mammo bilateral screening tomosynthesis     Other Visit Diagnoses       Encounter for screening for other disorder                 Follow up in 6 months with a new PCP or sooner as needed. It has been a pleasure getting to know and care for you!

## 2024-05-16 ENCOUNTER — OFFICE VISIT (OUTPATIENT)
Dept: ORTHOPEDIC SURGERY | Facility: CLINIC | Age: 79
End: 2024-05-16
Payer: MEDICARE

## 2024-05-16 VITALS — WEIGHT: 109 LBS | BODY MASS INDEX: 18.16 KG/M2 | HEIGHT: 65 IN

## 2024-05-16 DIAGNOSIS — M17.12 PRIMARY OSTEOARTHRITIS OF LEFT KNEE: Primary | ICD-10-CM

## 2024-05-16 PROCEDURE — 1160F RVW MEDS BY RX/DR IN RCRD: CPT | Performed by: ORTHOPAEDIC SURGERY

## 2024-05-16 PROCEDURE — 1036F TOBACCO NON-USER: CPT | Performed by: ORTHOPAEDIC SURGERY

## 2024-05-16 PROCEDURE — 1159F MED LIST DOCD IN RCRD: CPT | Performed by: ORTHOPAEDIC SURGERY

## 2024-05-16 PROCEDURE — 1125F AMNT PAIN NOTED PAIN PRSNT: CPT | Performed by: ORTHOPAEDIC SURGERY

## 2024-05-16 PROCEDURE — 20610 DRAIN/INJ JOINT/BURSA W/O US: CPT | Performed by: ORTHOPAEDIC SURGERY

## 2024-05-16 ASSESSMENT — PAIN SCALES - GENERAL: PAINLEVEL_OUTOF10: 8

## 2024-05-16 ASSESSMENT — PAIN DESCRIPTION - DESCRIPTORS: DESCRIPTORS: DISCOMFORT;SORE;ACHING

## 2024-05-16 ASSESSMENT — PAIN - FUNCTIONAL ASSESSMENT: PAIN_FUNCTIONAL_ASSESSMENT: 0-10

## 2024-05-16 NOTE — PROGRESS NOTES
Patient presents today for left knee cortisone injection.  She would like to proceed with total knee arthroplasty.  She is get my office card and number can call to schedule surgery at her convenience.    L Inj/Asp: L knee on 5/16/2024 11:44 AM  Indications: pain and joint swelling  Details: 22 G needle, anterolateral approach    Discussion:  I discussed the conservative treatment options for knee osteoarthritis including but not limited to physical therapy, oral NSAIDS, activity and lifestyle modification, and corticosteroid injections. Pt has elected to undergo a cortisone injection today. I have explained the risk and benefits of an injection including the possibility of joint infection, bleeding, damage to cartilage, allergic reaction. Patient verbalized understanding and gave verbal consent wishes to proceed with a intra-articular cortisone injection for their knee.    Procedure:  After discussing the risk and benefits of the procedure, we proceeded with an intra-articular left knee injection.    With the patient's informed verbal consent, the left knee was prepped in standard sterile fashion with Chlorhexidine. The skin was then anesthetized with ethyl chloride spray and cleaned again with Chlorhexidine. The knee was then apirated/injected with a prefilled 20-gauge syringe of 40 mg Kenalog + 4 ml Lidocaine using the lateral approach without complications.  The patient tolerated this well and felt immediate initial relief of symptoms. A bandaid was applied and the patient ambulated out of the clinic on ther own accord without difficulty. Patient was instructed to avoid physical activity for 24-48 hours to prevent the knees from swelling and may ice the knees as tolerated. Patient should contact the office if any signs of of infection appear: redness, fever, chills, drainage, swelling or warmth to the knees.  Pt understands that the injections can be repeated no sooner than 3 months.  Procedure, treatment  alternatives, risks and benefits explained, specific risks discussed. Consent was given by the patient. Immediately prior to procedure a time out was called to verify the correct patient, procedure, equipment, support staff and site/side marked as required. Patient was prepped and draped in the usual sterile fashion.        M17.12  [id number]  [facility] [place]  [place]night  [employer]

## 2024-05-24 ENCOUNTER — APPOINTMENT (OUTPATIENT)
Dept: RADIOLOGY | Facility: HOSPITAL | Age: 79
End: 2024-05-24
Payer: MEDICARE

## 2024-06-05 ENCOUNTER — HOSPITAL ENCOUNTER (OUTPATIENT)
Dept: RADIOLOGY | Facility: HOSPITAL | Age: 79
Discharge: HOME | End: 2024-06-05
Payer: MEDICARE

## 2024-06-05 VITALS — WEIGHT: 110 LBS | BODY MASS INDEX: 18.33 KG/M2 | HEIGHT: 65 IN

## 2024-06-05 DIAGNOSIS — Z12.31 ENCOUNTER FOR SCREENING MAMMOGRAM FOR BREAST CANCER: ICD-10-CM

## 2024-06-05 PROCEDURE — 77067 SCR MAMMO BI INCL CAD: CPT

## 2024-06-05 PROCEDURE — 77067 SCR MAMMO BI INCL CAD: CPT | Performed by: RADIOLOGY

## 2024-06-05 PROCEDURE — 77063 BREAST TOMOSYNTHESIS BI: CPT | Performed by: RADIOLOGY

## 2024-08-01 ENCOUNTER — OFFICE VISIT (OUTPATIENT)
Dept: PRIMARY CARE | Facility: CLINIC | Age: 79
End: 2024-08-01
Payer: MEDICARE

## 2024-08-01 VITALS
HEART RATE: 90 BPM | DIASTOLIC BLOOD PRESSURE: 76 MMHG | SYSTOLIC BLOOD PRESSURE: 150 MMHG | OXYGEN SATURATION: 98 % | BODY MASS INDEX: 18.04 KG/M2 | WEIGHT: 108.4 LBS

## 2024-08-01 DIAGNOSIS — E78.2 MIXED HYPERLIPIDEMIA: ICD-10-CM

## 2024-08-01 DIAGNOSIS — M17.0 PRIMARY OSTEOARTHRITIS OF BOTH KNEES: ICD-10-CM

## 2024-08-01 DIAGNOSIS — R73.01 IFG (IMPAIRED FASTING GLUCOSE): ICD-10-CM

## 2024-08-01 DIAGNOSIS — E55.9 VITAMIN D DEFICIENCY: Primary | ICD-10-CM

## 2024-08-01 DIAGNOSIS — D72.810 LYMPHOPENIA: ICD-10-CM

## 2024-08-01 DIAGNOSIS — M81.0 AGE-RELATED OSTEOPOROSIS WITHOUT CURRENT PATHOLOGICAL FRACTURE: ICD-10-CM

## 2024-08-01 PROCEDURE — 1159F MED LIST DOCD IN RCRD: CPT | Performed by: PHYSICIAN ASSISTANT

## 2024-08-01 PROCEDURE — 1036F TOBACCO NON-USER: CPT | Performed by: PHYSICIAN ASSISTANT

## 2024-08-01 PROCEDURE — 1124F ACP DISCUSS-NO DSCNMKR DOCD: CPT | Performed by: PHYSICIAN ASSISTANT

## 2024-08-01 PROCEDURE — 1125F AMNT PAIN NOTED PAIN PRSNT: CPT | Performed by: PHYSICIAN ASSISTANT

## 2024-08-01 PROCEDURE — 99204 OFFICE O/P NEW MOD 45 MIN: CPT | Performed by: PHYSICIAN ASSISTANT

## 2024-08-01 PROCEDURE — 1160F RVW MEDS BY RX/DR IN RCRD: CPT | Performed by: PHYSICIAN ASSISTANT

## 2024-08-01 PROCEDURE — 99214 OFFICE O/P EST MOD 30 MIN: CPT | Performed by: PHYSICIAN ASSISTANT

## 2024-08-01 ASSESSMENT — ENCOUNTER SYMPTOMS
ABDOMINAL PAIN: 0
LIGHT-HEADEDNESS: 0
WEAKNESS: 0
BLOOD IN STOOL: 0
SHORTNESS OF BREATH: 0
UNEXPECTED WEIGHT CHANGE: 0
DIZZINESS: 0
ARTHRALGIAS: 1
FEVER: 0

## 2024-08-01 ASSESSMENT — PAIN SCALES - GENERAL: PAINLEVEL: 4

## 2024-08-01 ASSESSMENT — COLUMBIA-SUICIDE SEVERITY RATING SCALE - C-SSRS
2. HAVE YOU ACTUALLY HAD ANY THOUGHTS OF KILLING YOURSELF?: NO
6. HAVE YOU EVER DONE ANYTHING, STARTED TO DO ANYTHING, OR PREPARED TO DO ANYTHING TO END YOUR LIFE?: NO
1. IN THE PAST MONTH, HAVE YOU WISHED YOU WERE DEAD OR WISHED YOU COULD GO TO SLEEP AND NOT WAKE UP?: NO

## 2024-08-01 NOTE — PROGRESS NOTES
Subjective   Patient ID: Lydia Limon is a 79 y.o. female who presents for Establish Care (Pt is here to establish care, no new concerns /nr).    HPI   HLD/cardiomyopathy - managed by Dr. Chu. On atorvastatin, wanted her to increase but pt declined.    Osteoporosis - T-score -3.5 femoral neck. We did discuss high risk of fracture. She is not interested in taking medications though will consider it. Hx of parathyroid tumor removal. Declines referral to endo.     Chronic lymphopenia - has never seen heme/onc. Denies night sweats, fever, weight loss, LAD. Levels have been stable x 3 years.    B/l knee pain, L worse than R - managed by Dr. Kaur. Had CSI, has plans for L TKR.    White coat HTN - BP elevated today but states normal at home.      Review of Systems   Constitutional:  Negative for fever and unexpected weight change.   Respiratory:  Negative for shortness of breath.    Cardiovascular:  Negative for chest pain.   Gastrointestinal:  Negative for abdominal pain and blood in stool.   Musculoskeletal:  Positive for arthralgias.   Skin:  Negative for rash.   Neurological:  Negative for dizziness, weakness and light-headedness.       Objective   /76   Pulse 90   Wt 49.2 kg (108 lb 6.4 oz)   SpO2 98%   BMI 18.04 kg/m²     Physical Exam  Constitutional:       Appearance: Normal appearance.   HENT:      Head: Normocephalic and atraumatic.   Eyes:      Extraocular Movements: Extraocular movements intact.      Conjunctiva/sclera: Conjunctivae normal.   Cardiovascular:      Rate and Rhythm: Normal rate and regular rhythm.      Heart sounds: Normal heart sounds.   Pulmonary:      Effort: Pulmonary effort is normal.      Breath sounds: Normal breath sounds. No wheezing or rhonchi.   Musculoskeletal:      Cervical back: Normal range of motion and neck supple.   Skin:     General: Skin is warm.      Findings: No rash.   Neurological:      General: No focal deficit present.      Mental Status: She  is alert.         Assessment/Plan   Problem List Items Addressed This Visit             ICD-10-CM    Hyperlipidemia E78.5    Relevant Orders    Lipid Panel    Osteoporosis M81.0    Lymphopenia D72.810    Relevant Orders    CBC and Auto Differential    Primary osteoarthritis of both knees M17.0     Other Visit Diagnoses         Codes    Vitamin D deficiency    -  Primary E55.9    Relevant Orders    Vitamin D 25-Hydroxy,Total (for eval of Vitamin D levels)    IFG (impaired fasting glucose)     R73.01    Relevant Orders    Comprehensive Metabolic Panel    Hemoglobin A1C          Osteoporosis - pt info printed from [facility] on management of osteoporosis. She will take time to think about it.    Lymphopenia - labs have remained stable and pt asymptomatic. Will continue to monitor.

## 2024-08-14 ENCOUNTER — LAB (OUTPATIENT)
Dept: LAB | Facility: LAB | Age: 79
End: 2024-08-14
Payer: MEDICARE

## 2024-08-14 DIAGNOSIS — E55.9 VITAMIN D DEFICIENCY: ICD-10-CM

## 2024-08-14 DIAGNOSIS — D72.810 LYMPHOPENIA: ICD-10-CM

## 2024-08-14 DIAGNOSIS — R73.01 IFG (IMPAIRED FASTING GLUCOSE): ICD-10-CM

## 2024-08-14 DIAGNOSIS — E78.2 MIXED HYPERLIPIDEMIA: ICD-10-CM

## 2024-08-14 LAB
25(OH)D3 SERPL-MCNC: 85 NG/ML (ref 30–100)
ALBUMIN SERPL BCP-MCNC: 4.6 G/DL (ref 3.4–5)
ALP SERPL-CCNC: 59 U/L (ref 33–136)
ALT SERPL W P-5'-P-CCNC: 20 U/L (ref 7–45)
ANION GAP SERPL CALC-SCNC: 13 MMOL/L (ref 10–20)
AST SERPL W P-5'-P-CCNC: 20 U/L (ref 9–39)
BASOPHILS # BLD AUTO: 0.03 X10*3/UL (ref 0–0.1)
BASOPHILS NFR BLD AUTO: 0.6 %
BILIRUB SERPL-MCNC: 1 MG/DL (ref 0–1.2)
BUN SERPL-MCNC: 22 MG/DL (ref 6–23)
CALCIUM SERPL-MCNC: 10.1 MG/DL (ref 8.6–10.6)
CHLORIDE SERPL-SCNC: 102 MMOL/L (ref 98–107)
CHOLEST SERPL-MCNC: 191 MG/DL (ref 0–199)
CHOLESTEROL/HDL RATIO: 3.2
CO2 SERPL-SCNC: 28 MMOL/L (ref 21–32)
CREAT SERPL-MCNC: 0.86 MG/DL (ref 0.5–1.05)
EGFRCR SERPLBLD CKD-EPI 2021: 70 ML/MIN/1.73M*2
EOSINOPHIL # BLD AUTO: 0.16 X10*3/UL (ref 0–0.4)
EOSINOPHIL NFR BLD AUTO: 3.3 %
ERYTHROCYTE [DISTWIDTH] IN BLOOD BY AUTOMATED COUNT: 12.5 % (ref 11.5–14.5)
EST. AVERAGE GLUCOSE BLD GHB EST-MCNC: 111 MG/DL
GLUCOSE SERPL-MCNC: 100 MG/DL (ref 74–99)
HBA1C MFR BLD: 5.5 %
HCT VFR BLD AUTO: 40.6 % (ref 36–46)
HDLC SERPL-MCNC: 59.8 MG/DL
HGB BLD-MCNC: 13.3 G/DL (ref 12–16)
IMM GRANULOCYTES # BLD AUTO: 0.01 X10*3/UL (ref 0–0.5)
IMM GRANULOCYTES NFR BLD AUTO: 0.2 % (ref 0–0.9)
LDLC SERPL CALC-MCNC: 107 MG/DL
LYMPHOCYTES # BLD AUTO: 0.75 X10*3/UL (ref 0.8–3)
LYMPHOCYTES NFR BLD AUTO: 15.2 %
MCH RBC QN AUTO: 29.2 PG (ref 26–34)
MCHC RBC AUTO-ENTMCNC: 32.8 G/DL (ref 32–36)
MCV RBC AUTO: 89 FL (ref 80–100)
MONOCYTES # BLD AUTO: 0.41 X10*3/UL (ref 0.05–0.8)
MONOCYTES NFR BLD AUTO: 8.3 %
NEUTROPHILS # BLD AUTO: 3.56 X10*3/UL (ref 1.6–5.5)
NEUTROPHILS NFR BLD AUTO: 72.4 %
NON HDL CHOLESTEROL: 131 MG/DL (ref 0–149)
NRBC BLD-RTO: 0 /100 WBCS (ref 0–0)
PLATELET # BLD AUTO: 184 X10*3/UL (ref 150–450)
POTASSIUM SERPL-SCNC: 4.2 MMOL/L (ref 3.5–5.3)
PROT SERPL-MCNC: 7.1 G/DL (ref 6.4–8.2)
RBC # BLD AUTO: 4.55 X10*6/UL (ref 4–5.2)
SODIUM SERPL-SCNC: 139 MMOL/L (ref 136–145)
TRIGL SERPL-MCNC: 119 MG/DL (ref 0–149)
VLDL: 24 MG/DL (ref 0–40)
WBC # BLD AUTO: 4.9 X10*3/UL (ref 4.4–11.3)

## 2024-08-14 PROCEDURE — 80053 COMPREHEN METABOLIC PANEL: CPT

## 2024-08-14 PROCEDURE — 85025 COMPLETE CBC W/AUTO DIFF WBC: CPT

## 2024-08-14 PROCEDURE — 83036 HEMOGLOBIN GLYCOSYLATED A1C: CPT

## 2024-08-14 PROCEDURE — 80061 LIPID PANEL: CPT

## 2024-08-14 PROCEDURE — 82306 VITAMIN D 25 HYDROXY: CPT

## 2024-08-14 PROCEDURE — 36415 COLL VENOUS BLD VENIPUNCTURE: CPT

## 2025-02-03 PROBLEM — M17.12 UNILATERAL PRIMARY OSTEOARTHRITIS, LEFT KNEE: Status: ACTIVE | Noted: 2025-02-02

## 2025-02-10 ENCOUNTER — OFFICE VISIT (OUTPATIENT)
Dept: CARDIOLOGY | Facility: CLINIC | Age: 80
End: 2025-02-10
Payer: MEDICARE

## 2025-02-10 VITALS
WEIGHT: 110.8 LBS | HEART RATE: 65 BPM | SYSTOLIC BLOOD PRESSURE: 140 MMHG | HEIGHT: 65 IN | OXYGEN SATURATION: 97 % | DIASTOLIC BLOOD PRESSURE: 80 MMHG | BODY MASS INDEX: 18.46 KG/M2

## 2025-02-10 DIAGNOSIS — I42.9 PRIMARY CARDIOMYOPATHY (MULTI): Primary | ICD-10-CM

## 2025-02-10 PROCEDURE — 99213 OFFICE O/P EST LOW 20 MIN: CPT | Mod: 25 | Performed by: INTERNAL MEDICINE

## 2025-02-10 PROCEDURE — 1036F TOBACCO NON-USER: CPT | Performed by: INTERNAL MEDICINE

## 2025-02-10 PROCEDURE — 93010 ELECTROCARDIOGRAM REPORT: CPT | Performed by: INTERNAL MEDICINE

## 2025-02-10 PROCEDURE — 1159F MED LIST DOCD IN RCRD: CPT | Performed by: INTERNAL MEDICINE

## 2025-02-10 PROCEDURE — 93005 ELECTROCARDIOGRAM TRACING: CPT | Performed by: INTERNAL MEDICINE

## 2025-02-10 PROCEDURE — 99213 OFFICE O/P EST LOW 20 MIN: CPT | Performed by: INTERNAL MEDICINE

## 2025-02-10 PROCEDURE — 1126F AMNT PAIN NOTED NONE PRSNT: CPT | Performed by: INTERNAL MEDICINE

## 2025-02-10 PROCEDURE — 1160F RVW MEDS BY RX/DR IN RCRD: CPT | Performed by: INTERNAL MEDICINE

## 2025-02-10 ASSESSMENT — PATIENT HEALTH QUESTIONNAIRE - PHQ9
1. LITTLE INTEREST OR PLEASURE IN DOING THINGS: NOT AT ALL
SUM OF ALL RESPONSES TO PHQ9 QUESTIONS 1 AND 2: 0
2. FEELING DOWN, DEPRESSED OR HOPELESS: NOT AT ALL

## 2025-02-10 ASSESSMENT — PAIN SCALES - GENERAL: PAINLEVEL_OUTOF10: 0-NO PAIN

## 2025-02-10 NOTE — PROGRESS NOTES
Subjective:  Lydia returns for a routine annual follow-up.  She continues to remain asymptomatic at a good activity level.  Her left knee arthritis has progressed, and she is anticipating knee replacement surgery in the near future.    She has not had any hospitalizations and denies any other new health concerns.  She remains compliant with her low-dose atorvastatin, and she opted not to increase the dosing as suggested at last visit.    Objective:  General: Alert, usual pleasant self.  HEENT: Unchanged.  Lungs: Clear without crackles.  Cardiac: Normal S1 and S2.  Abdomen: Nontender.    EKG: Normal sinus rhythm.  Within normal limits.    Lipid panel: Cholesterol-191, HDL-60, LDL-107, TG-119.    Impression/plan:  Lydia remains clinically stable at this time.  Given this, I do think she is fine to proceed with knee surgery as planned at low cardiac risk.    Her blood pressure is mildly elevated today but all of her readings at home are excellent.    Given her minimally elevated remote coronary artery calcium score, I will continue her low-dose  statin unchanged and will not escalate the dosing further at this time.    I will see her back for routine follow-up in 1 year assuming no intercurrent problems.    Patient instructions:    You are cleared for any surgery.    Continue current medications unchanged.    Return to clinic in 1 year.

## 2025-02-11 LAB
ATRIAL RATE: 65 BPM
P AXIS: 71 DEGREES
P OFFSET: 201 MS
P ONSET: 139 MS
PR INTERVAL: 164 MS
Q ONSET: 221 MS
QRS COUNT: 10 BEATS
QRS DURATION: 98 MS
QT INTERVAL: 408 MS
QTC CALCULATION(BAZETT): 424 MS
QTC FREDERICIA: 418 MS
R AXIS: -7 DEGREES
T AXIS: 37 DEGREES
T OFFSET: 425 MS
VENTRICULAR RATE: 65 BPM

## 2025-02-17 ENCOUNTER — APPOINTMENT (OUTPATIENT)
Dept: CARDIOLOGY | Facility: CLINIC | Age: 80
End: 2025-02-17
Payer: MEDICARE

## 2025-02-20 ENCOUNTER — APPOINTMENT (OUTPATIENT)
Dept: ORTHOPEDIC SURGERY | Facility: CLINIC | Age: 80
End: 2025-02-20
Payer: MEDICARE

## 2025-03-03 ENCOUNTER — OFFICE VISIT (OUTPATIENT)
Dept: PRIMARY CARE | Facility: CLINIC | Age: 80
End: 2025-03-03
Payer: MEDICARE

## 2025-03-03 VITALS
WEIGHT: 111 LBS | SYSTOLIC BLOOD PRESSURE: 128 MMHG | HEART RATE: 74 BPM | OXYGEN SATURATION: 98 % | DIASTOLIC BLOOD PRESSURE: 70 MMHG | BODY MASS INDEX: 18.47 KG/M2

## 2025-03-03 DIAGNOSIS — N95.9 MENOPAUSAL DISORDER: ICD-10-CM

## 2025-03-03 DIAGNOSIS — E78.2 MIXED HYPERLIPIDEMIA: ICD-10-CM

## 2025-03-03 DIAGNOSIS — E55.9 VITAMIN D DEFICIENCY: ICD-10-CM

## 2025-03-03 DIAGNOSIS — Z12.31 BREAST CANCER SCREENING BY MAMMOGRAM: Primary | ICD-10-CM

## 2025-03-03 DIAGNOSIS — Z11.59 ENCOUNTER FOR HEPATITIS C SCREENING TEST FOR LOW RISK PATIENT: ICD-10-CM

## 2025-03-03 DIAGNOSIS — Z00.00 ROUTINE MEDICAL EXAM: ICD-10-CM

## 2025-03-03 DIAGNOSIS — Z13.1 SCREENING FOR DIABETES MELLITUS: ICD-10-CM

## 2025-03-03 PROBLEM — M17.10 ARTHRITIS OF KNEE: Status: RESOLVED | Noted: 2023-08-28 | Resolved: 2025-03-03

## 2025-03-03 PROBLEM — M54.9 BACK PAIN: Status: RESOLVED | Noted: 2023-08-28 | Resolved: 2025-03-03

## 2025-03-03 PROBLEM — M17.12 UNILATERAL PRIMARY OSTEOARTHRITIS, LEFT KNEE: Status: RESOLVED | Noted: 2025-02-02 | Resolved: 2025-03-03

## 2025-03-03 PROBLEM — S22.39XA FRACTURE OF RIB, SINGLE, CLOSED: Status: RESOLVED | Noted: 2023-08-28 | Resolved: 2025-03-03

## 2025-03-03 PROBLEM — R50.9 FEVER: Status: RESOLVED | Noted: 2023-08-28 | Resolved: 2025-03-03

## 2025-03-03 PROBLEM — N95.1 POSTMENOPAUSAL DISORDER: Status: RESOLVED | Noted: 2023-08-28 | Resolved: 2025-03-03

## 2025-03-03 PROBLEM — R94.31 ABNORMAL EKG: Status: RESOLVED | Noted: 2023-08-28 | Resolved: 2025-03-03

## 2025-03-03 PROBLEM — M62.838 MUSCLE SPASM: Status: RESOLVED | Noted: 2023-08-28 | Resolved: 2025-03-03

## 2025-03-03 PROBLEM — M25.562 LEFT KNEE PAIN: Status: RESOLVED | Noted: 2023-08-28 | Resolved: 2025-03-03

## 2025-03-03 PROBLEM — M17.0 PRIMARY OSTEOARTHRITIS OF BOTH KNEES: Status: RESOLVED | Noted: 2024-02-16 | Resolved: 2025-03-03

## 2025-03-03 PROBLEM — R79.9 ABNORMAL BLOOD CHEMISTRY: Status: RESOLVED | Noted: 2023-08-28 | Resolved: 2025-03-03

## 2025-03-03 PROCEDURE — 1160F RVW MEDS BY RX/DR IN RCRD: CPT | Performed by: PHYSICIAN ASSISTANT

## 2025-03-03 PROCEDURE — 1126F AMNT PAIN NOTED NONE PRSNT: CPT | Performed by: PHYSICIAN ASSISTANT

## 2025-03-03 PROCEDURE — G0439 PPPS, SUBSEQ VISIT: HCPCS | Performed by: PHYSICIAN ASSISTANT

## 2025-03-03 PROCEDURE — 1124F ACP DISCUSS-NO DSCNMKR DOCD: CPT | Performed by: PHYSICIAN ASSISTANT

## 2025-03-03 PROCEDURE — 1170F FXNL STATUS ASSESSED: CPT | Performed by: PHYSICIAN ASSISTANT

## 2025-03-03 PROCEDURE — 1036F TOBACCO NON-USER: CPT | Performed by: PHYSICIAN ASSISTANT

## 2025-03-03 PROCEDURE — 99215 OFFICE O/P EST HI 40 MIN: CPT | Performed by: PHYSICIAN ASSISTANT

## 2025-03-03 PROCEDURE — 1159F MED LIST DOCD IN RCRD: CPT | Performed by: PHYSICIAN ASSISTANT

## 2025-03-03 ASSESSMENT — ENCOUNTER SYMPTOMS
SHORTNESS OF BREATH: 0
DIZZINESS: 0
BLOOD IN STOOL: 0
LIGHT-HEADEDNESS: 0
HEADACHES: 0
ABDOMINAL PAIN: 0

## 2025-03-03 ASSESSMENT — ACTIVITIES OF DAILY LIVING (ADL)
PREPARING MEALS: INDEPENDENT
FEEDING: INDEPENDENT
MANAGING FINANCES: INDEPENDENT
TAKING MEDICATION: INDEPENDENT
NEEDS ASSISTANCE WITH FOOD: INDEPENDENT
USING TRANSPORTATION: INDEPENDENT
GROCERY SHOPPING: INDEPENDENT
EATING: INDEPENDENT
DOING HOUSEWORK: INDEPENDENT
STIL DRIVING: YES
PILL BOX USED: NO
JUDGMENT_ADEQUATE_SAFELY_COMPLETE_DAILY_ACTIVITIES: YES
USING TELEPHONE: INDEPENDENT
DRESSING: INDEPENDENT
ADEQUATE_TO_COMPLETE_ADL: YES
TOILETING: INDEPENDENT
BATHING: INDEPENDENT

## 2025-03-03 ASSESSMENT — COLUMBIA-SUICIDE SEVERITY RATING SCALE - C-SSRS
1. IN THE PAST MONTH, HAVE YOU WISHED YOU WERE DEAD OR WISHED YOU COULD GO TO SLEEP AND NOT WAKE UP?: NO
6. HAVE YOU EVER DONE ANYTHING, STARTED TO DO ANYTHING, OR PREPARED TO DO ANYTHING TO END YOUR LIFE?: NO
2. HAVE YOU ACTUALLY HAD ANY THOUGHTS OF KILLING YOURSELF?: NO

## 2025-03-03 ASSESSMENT — COGNITIVE AND FUNCTIONAL STATUS - GENERAL
TRAIL MAKING TEST: PATIENT COMPLETES TRAIL MAKING TEST PROPERLY.
VERBAL FLUENCY - ANIMAL NAMES (0 TO 25): 3

## 2025-03-03 ASSESSMENT — PAIN SCALES - GENERAL: PAINLEVEL_OUTOF10: 0-NO PAIN

## 2025-03-03 NOTE — PROGRESS NOTES
Subjective   Patient ID: Lydia Limon is a 79 y.o. female who presents for Annual Exam (Pt is here for physical / nr).    HPI   Active Medical Problems: HLD, cardiomyopathy, osteoporosis, chronic lymphopenia, b/l knee pain  Other Providers: Dr. Kaur (ortho), Dr. Chu, ophthalmology, dentist  PMH/PSH/FMH/SHX: Reviewed  Advanced Directives: Discussed  Hearing changes: deaf in L ear, hx of otosclerosis  Depression: Denies  Functional Ability/Safety: Get up and go test <30s. Denies issues with ADLs. No home safety concerns. Pt denies falls  Cognitive Eval: See MMSE  Preventative Services: mammogram 6/5/24    Has upcoming L knee TKR. Cleared by cardiology.      Review of Systems   HENT:  Positive for hearing loss.    Eyes:  Negative for visual disturbance.   Respiratory:  Negative for shortness of breath.    Cardiovascular:  Negative for chest pain.   Gastrointestinal:  Negative for abdominal pain and blood in stool.   Genitourinary:  Negative for vaginal bleeding.   Neurological:  Negative for dizziness, light-headedness and headaches.       Objective   /70   Pulse 74   Wt 50.3 kg (111 lb)   SpO2 98%   BMI 18.47 kg/m²     Physical Exam  Vitals reviewed.   Constitutional:       Appearance: Normal appearance.   HENT:      Head: Normocephalic and atraumatic.      Right Ear: Tympanic membrane and ear canal normal.      Left Ear: Tympanic membrane and ear canal normal.      Nose: Nose normal.      Mouth/Throat:      Mouth: Mucous membranes are moist.      Pharynx: No oropharyngeal exudate.   Eyes:      Extraocular Movements: Extraocular movements intact.      Conjunctiva/sclera: Conjunctivae normal.      Pupils: Pupils are equal, round, and reactive to light.   Cardiovascular:      Rate and Rhythm: Normal rate and regular rhythm.      Heart sounds: Normal heart sounds.   Pulmonary:      Effort: Pulmonary effort is normal.      Breath sounds: Normal breath sounds. No wheezing.   Abdominal:       General: There is no distension.      Palpations: Abdomen is soft.      Tenderness: There is no abdominal tenderness.   Musculoskeletal:         General: No tenderness.      Cervical back: Normal range of motion and neck supple.   Skin:     General: Skin is warm and dry.      Findings: No rash.   Neurological:      General: No focal deficit present.      Mental Status: She is alert. Mental status is at baseline.   Psychiatric:         Mood and Affect: Mood normal.         Assessment/Plan   Problem List Items Addressed This Visit             ICD-10-CM    Hyperlipidemia E78.5    Relevant Orders    Lipid Panel     Other Visit Diagnoses         Codes    Breast cancer screening by mammogram    -  Primary Z12.31    Relevant Orders    BI mammo bilateral screening tomosynthesis    Menopausal disorder     N95.9    Relevant Orders    XR DEXA bone density    Routine medical exam     Z00.00    Vitamin D deficiency     E55.9    Relevant Orders    Vitamin D 25-Hydroxy,Total (for eval of Vitamin D levels)    Encounter for hepatitis C screening test for low risk patient     Z11.59    Relevant Orders    Hepatitis C Antibody    Screening for diabetes mellitus     Z13.1    Relevant Orders    Comprehensive Metabolic Panel          Declines referral to discuss osteoporosis. Wants to repeat DEXA first.    Declines PNA vaccine

## 2025-03-06 ENCOUNTER — OFFICE VISIT (OUTPATIENT)
Dept: ORTHOPEDIC SURGERY | Facility: CLINIC | Age: 80
End: 2025-03-06
Payer: MEDICARE

## 2025-03-06 DIAGNOSIS — M17.12 PRIMARY OSTEOARTHRITIS OF LEFT KNEE: Primary | ICD-10-CM

## 2025-03-06 PROCEDURE — 99214 OFFICE O/P EST MOD 30 MIN: CPT | Performed by: ORTHOPAEDIC SURGERY

## 2025-03-06 NOTE — PROGRESS NOTES
Patient is a pleasant 79-year-old female who presents today for discussion of upcoming left total knee arthroplasty having decided to proceed with surgery.  She is failed a greater than 3-month trial reasonable conservative treatment including anti-inflammatories, physical therapy, and cortisone injections.  She has difficulty going downstairs or walking sort of distance.  She rates her pain at times is 8 out of 10.  She is dreamy fed up with her quality of life and would like to proceed with total knee arthroplasty which is indicated at this time.    Left knee:  AAOx3, NAD, walks with a moderate antalgic gait  Varus allignment  Range of motion lacks 10 degrees of full extension and flexes to 110 degrees  Stable to varus/valgus/anterior/posterior stress through out the range of motion  Slight laxity with varus stress  Diffuse medial  joint line tenderness to palpation  Moderate effusion  SILT in a allison/saph/per/tib distribution  5/5 knee extension/df/pf/ehl  ½ dorsalis pedis and posterior tibial pulse  no popliteal lymphadenopathy  no other overlying lesions  mood: euthymic  Respirations non labored    Plain films were reviewed by myself in clinic today.  They have advanced osteoarthritis of their knee with significant joint space narrowing, bone on bone changes, underlying sclerosis and tricompartmental osteophytic change.    Patient is now failed a greater than 3-month trial of reasonable conservative treatment and wishes proceed with surgery which is indicated at this time.  Discussed the risk benefits alternatives to surgery.  All of their questions were answered.    Procedure: left total knee  Location: [facility] [place]  ICD10: M17.12  CPT: 13940  Stay: overnight  Equipment: [facility]    I talked with the patient at length about risks, limitations, benefits and alternatives to total knee replacement today. I reviewed concerns about implant wear, loosening, breakage, infection and infection prophylaxis, DVT, PE,  death and other medical and anesthetic complications of surgery. We talked about the potential for persistent pain following surgery since there are many possible causes for knee and leg pain. The patient was advised that knee replacement will only relieve pain that is coming from the knee. We talked about limited range of motion following knee replacement and the importance of physical therapy and their motivation. We talked about improvements in pain management post-operatively and our accelerated rehab program. We talked about wound healing issues and neurovascular complications of surgery. I reviewed functional and activity restrictions in detail. We discussed the fact that many of our patients are able to go home in 1 day or less depending on their health, mobility, pre-op preparation, individual home situation and personal preference.  The patient has identified their personal goals of their joint replacement surgery and recovery and we have discussed them. These are documented in our [employer] patient engagement platform. In addition, we have discussed the advantages and disadvantages of various implant and fixation options, as well as various surgical approaches.  The basic concepts of the joint replacement procedure has been reviewed with the patient and the patient has been provided the opportunity to see an actual implant either in the office or in our pre-op education class.  All of the patients questions were answered. The patient can call my office to schedule surgery and the pre-op teaching class. I told the patient that they should contact their primary care physician to discuss fitness for surgery.    This note was created using voice recognition software and was not corrected for typographical or grammatical errors.

## 2025-03-14 ENCOUNTER — HOSPITAL ENCOUNTER (OUTPATIENT)
Dept: RADIOLOGY | Facility: HOSPITAL | Age: 80
Discharge: HOME | End: 2025-03-14
Payer: MEDICARE

## 2025-03-14 ENCOUNTER — LAB (OUTPATIENT)
Dept: LAB | Facility: HOSPITAL | Age: 80
End: 2025-03-14
Payer: MEDICARE

## 2025-03-14 ENCOUNTER — PRE-ADMISSION TESTING (OUTPATIENT)
Dept: PREADMISSION TESTING | Facility: HOSPITAL | Age: 80
End: 2025-03-14
Payer: MEDICARE

## 2025-03-14 ENCOUNTER — EDUCATION (OUTPATIENT)
Dept: ORTHOPEDIC SURGERY | Facility: HOSPITAL | Age: 80
End: 2025-03-14
Payer: MEDICARE

## 2025-03-14 VITALS
RESPIRATION RATE: 16 BRPM | WEIGHT: 109 LBS | OXYGEN SATURATION: 100 % | BODY MASS INDEX: 18.16 KG/M2 | HEIGHT: 65 IN | DIASTOLIC BLOOD PRESSURE: 80 MMHG | SYSTOLIC BLOOD PRESSURE: 149 MMHG | HEART RATE: 76 BPM | TEMPERATURE: 97.9 F

## 2025-03-14 DIAGNOSIS — M17.12 UNILATERAL PRIMARY OSTEOARTHRITIS, LEFT KNEE: ICD-10-CM

## 2025-03-14 DIAGNOSIS — R73.09 ELEVATED GLUCOSE: ICD-10-CM

## 2025-03-14 DIAGNOSIS — Z01.818 ENCOUNTER FOR OTHER PREPROCEDURAL EXAMINATION: Primary | ICD-10-CM

## 2025-03-14 DIAGNOSIS — Z01.818 PREOP TESTING: Primary | ICD-10-CM

## 2025-03-14 LAB
ALBUMIN SERPL BCP-MCNC: 4.5 G/DL (ref 3.4–5)
ALP SERPL-CCNC: 68 U/L (ref 33–136)
ALT SERPL W P-5'-P-CCNC: 17 U/L (ref 7–45)
ANION GAP SERPL CALC-SCNC: 13 MMOL/L (ref 10–20)
AST SERPL W P-5'-P-CCNC: 19 U/L (ref 9–39)
BILIRUB SERPL-MCNC: 0.5 MG/DL (ref 0–1.2)
BUN SERPL-MCNC: 12 MG/DL (ref 6–23)
CALCIUM SERPL-MCNC: 9.8 MG/DL (ref 8.6–10.3)
CHLORIDE SERPL-SCNC: 100 MMOL/L (ref 98–107)
CO2 SERPL-SCNC: 27 MMOL/L (ref 21–32)
CREAT SERPL-MCNC: 0.78 MG/DL (ref 0.5–1.05)
EGFRCR SERPLBLD CKD-EPI 2021: 78 ML/MIN/1.73M*2
ERYTHROCYTE [DISTWIDTH] IN BLOOD BY AUTOMATED COUNT: 12.4 % (ref 11.5–14.5)
EST. AVERAGE GLUCOSE BLD GHB EST-MCNC: 111 MG/DL
GLUCOSE SERPL-MCNC: 109 MG/DL (ref 74–99)
HBA1C MFR BLD: 5.5 %
HCT VFR BLD AUTO: 39.2 % (ref 36–46)
HGB BLD-MCNC: 12.8 G/DL (ref 12–16)
MCH RBC QN AUTO: 29 PG (ref 26–34)
MCHC RBC AUTO-ENTMCNC: 32.7 G/DL (ref 32–36)
MCV RBC AUTO: 89 FL (ref 80–100)
NRBC BLD-RTO: ABNORMAL /100{WBCS}
PLATELET # BLD AUTO: 182 X10*3/UL (ref 150–450)
POTASSIUM SERPL-SCNC: 3.9 MMOL/L (ref 3.5–5.3)
PROT SERPL-MCNC: 7.2 G/DL (ref 6.4–8.2)
RBC # BLD AUTO: 4.42 X10*6/UL (ref 4–5.2)
SODIUM SERPL-SCNC: 136 MMOL/L (ref 136–145)
WBC # BLD AUTO: 3.8 X10*3/UL (ref 4.4–11.3)

## 2025-03-14 PROCEDURE — 77073 BONE LENGTH STUDIES: CPT

## 2025-03-14 PROCEDURE — 87081 CULTURE SCREEN ONLY: CPT | Mod: BEALAB

## 2025-03-14 PROCEDURE — 85027 COMPLETE CBC AUTOMATED: CPT

## 2025-03-14 PROCEDURE — 73562 X-RAY EXAM OF KNEE 3: CPT | Mod: LT

## 2025-03-14 PROCEDURE — 80053 COMPREHEN METABOLIC PANEL: CPT

## 2025-03-14 PROCEDURE — 83036 HEMOGLOBIN GLYCOSYLATED A1C: CPT

## 2025-03-14 PROCEDURE — 99204 OFFICE O/P NEW MOD 45 MIN: CPT | Performed by: PHYSICIAN ASSISTANT

## 2025-03-14 RX ORDER — CHLORHEXIDINE GLUCONATE 40 MG/ML
SOLUTION TOPICAL DAILY
Qty: 470 ML | Refills: 0 | Status: SHIPPED | OUTPATIENT
Start: 2025-03-14 | End: 2025-03-19

## 2025-03-14 RX ORDER — CHLORHEXIDINE GLUCONATE ORAL RINSE 1.2 MG/ML
SOLUTION DENTAL
Qty: 473 ML | Refills: 0 | Status: SHIPPED | OUTPATIENT
Start: 2025-03-14

## 2025-03-14 ASSESSMENT — DUKE ACTIVITY SCORE INDEX (DASI)
TOTAL_SCORE: 36.7
CAN YOU RUN A SHORT DISTANCE: YES
CAN YOU DO LIGHT WORK AROUND THE HOUSE LIKE DUSTING OR WASHING DISHES: YES
CAN YOU PARTICIPATE IN MODERATE RECREATIONAL ACTIVITIES LIKE GOLF, BOWLING, DANCING, DOUBLES TENNIS OR THROWING A BASEBALL OR FOOTBALL: NO
CAN YOU PARTICIPATE IN STRENOUS SPORTS LIKE SWIMMING, SINGLES TENNIS, FOOTBALL, BASKETBALL, OR SKIING: NO
CAN YOU WALK A BLOCK OR TWO ON LEVEL GROUND: YES
CAN YOU DO YARD WORK LIKE RAKING LEAVES, WEEDING OR PUSHING A MOWER: YES
CAN YOU TAKE CARE OF YOURSELF (EAT, DRESS, BATHE, OR USE TOILET): YES
CAN YOU WALK INDOORS, SUCH AS AROUND YOUR HOUSE: YES
DASI METS SCORE: 7.3
CAN YOU DO HEAVY WORK AROUND THE HOUSE LIKE SCRUBBING FLOORS OR LIFTING AND MOVING HEAVY FURNITURE: NO
CAN YOU DO MODERATE WORK AROUND THE HOUSE LIKE VACUUMING, SWEEPING FLOORS OR CARRYING GROCERIES: YES
CAN YOU HAVE SEXUAL RELATIONS: YES
CAN YOU CLIMB A FLIGHT OF STAIRS OR WALK UP A HILL: YES

## 2025-03-14 ASSESSMENT — ENCOUNTER SYMPTOMS
CONSTITUTIONAL NEGATIVE: 1
NECK NEGATIVE: 1
GASTROINTESTINAL NEGATIVE: 1
RESPIRATORY NEGATIVE: 1
EYES NEGATIVE: 1
NEUROLOGICAL NEGATIVE: 1
CARDIOVASCULAR NEGATIVE: 1
ENDOCRINE NEGATIVE: 1

## 2025-03-14 ASSESSMENT — LIFESTYLE VARIABLES: SMOKING_STATUS: NONSMOKER

## 2025-03-14 ASSESSMENT — PAIN - FUNCTIONAL ASSESSMENT: PAIN_FUNCTIONAL_ASSESSMENT: 0-10

## 2025-03-14 NOTE — PREPROCEDURE INSTRUCTIONS
Medication List            Accurate as of March 14, 2025 12:42 PM. Always use your most recent med list.                AdviL 200 mg tablet  Generic drug: ibuprofen  Additional Medication Adjustments for Surgery: Take last dose 7 days before surgery  Notes to patient: Last dose 3/25/25     aspirin 81 mg EC tablet  Additional Medication Adjustments for Surgery: Take last dose 7 days before surgery  Notes to patient: Last dose 3/25/25     atorvastatin 10 mg tablet  Commonly known as: Lipitor  Take 1 tablet (10 mg) by mouth once daily.  Medication Adjustments for Surgery: Take/Use as prescribed     Caltrate 600 plus D 600 mg-20 mcg (800 unit) tablet,chewable  Generic drug: calcium carbonate-vitamin D3  Additional Medication Adjustments for Surgery: Take last dose 7 days before surgery  Notes to patient: Last dose 3/25/25     multivitamin tablet  Additional Medication Adjustments for Surgery: Take last dose 7 days before surgery  Notes to patient: Last dose 3/25/25     Sherrie-C crystals  Generic drug: ascorbic acid (vitamin C)  Additional Medication Adjustments for Surgery: Take last dose 7 days before surgery  Notes to patient: Last dose 3/25/25     vitamin E 180 mg (400 unit) capsule  Additional Medication Adjustments for Surgery: Take last dose 7 days before surgery  Notes to patient: Last dose 3/25/25                   Thank you for visiting [facility].  If you have any changes to your health condition, please call the surgeons office to alert them and give them details of your symptoms.      CONTACT SURGEON'S OFFICE IF YOU DEVELOP:  * Fever = 100.4 F   * New respiratory symptoms (e.g. cough, shortness of breath, respiratory distress, sore throat)  * Recent loss of taste or smell  *Flu like symptoms such as headache, fatigue or gastrointestinal symptoms  * You develop any open sores, shingles, burning or painful urination   AND/OR:  * You no longer wish to have the surgery.  * Any other  personal circumstances change that may lead to the need to cancel or defer this surgery.  *You were admitted to any hospital within one week of your planned procedure.     SMOKING:  *Quitting smoking can make a huge difference to your health and recovery from surgery.    *If you need help with quitting, call 5-969-QUIT-NOW.     SURGICAL TIME:  *You will be contacted between 2 p.m. and 3 p.m. the business day before your surgery with your arrival time.  *If you haven't received a call by 3pm, call [phone]  *Scheduled surgery times may change and you will be notified if this occurs-check your personal voicemail for any updates.     ON THE MORNING OF SURGERY:  *Wear comfortable, loose fitting clothing.   *Do not use moisturizers, creams, lotions or perfume.  *All jewelry and valuables should be left at home.  *Prosthetic devices such as contact lenses, hearing aids, dentures, eyelash extensions, hairpins and body piercing must be removed before surgery.     BRING WITH YOU:  *Photo ID and insurance card  *Current list of medications and allergies  *Pacemaker/Defibrillator/Heart stent cards  *CPAP machine and mask  *Slings/splints/crutches  *Copy of your complete Advanced Directive/DHPOA-if applicable  *Neurostimulator implant remote     PARKING AND ARRIVAL:  *Check in at the Main Entrance desk and let them know you are here for surgery.     IF YOU ARE HAVING OUTPATIENT/SAME DAY SURGERY:  *A responsible adult MUST accompany you at the time of discharge and stay with you for 24 hours after your surgery.  *You may NOT drive yourself home after surgery.  *You may use a taxi or ride sharing service ([employer], Uber) to return home ONLY if you are accompanied by a friend or family member.  *Instructions for resuming your medications will be provided by your surgeon.        Prerna Lock PA-C  P: [phone]  Department of Anesthesiology and Perioperative Medicine  --    Preoperative Fasting Guidelines    Why must I stop  eating and drinking near surgery time?  With sedation, food or liquid in your stomach can enter your lungs causing serious complications  Increases nausea and vomiting    When do I need to stop eating and drinking before my surgery?  Do not eat any food after midnight the night before your surgery/procedure.  You may have up to 13.5 ounces of clear liquid until TWO hours before your instructed arrival time to the hospital.  This includes water, black tea/coffee, (no milk or cream) apple juice, and electrolyte drinks (Gatorade)  You may chew gum until TWO hours before your surgery/procedure              Preoperative Brain Exercises    What are brain exercises?  A brain exercise is any activity that engages your thinking (cognitive) skills.    What types of activities are considered brain exercises?  Jigsaw puzzles, crossword puzzles, word jumble, memory games, word search, and many more.  Many can be found free online or on your phone via a mobile gisela.    Why should I do brain exercises before my surgery?  More recent research has shown brain exercise before surgery can lower the risk of postoperative delirium (confusion) which can be especially important for older adults.  Patients who did brain exercises for 5 to 10 hours the days before surgery, cut their risk of postoperative delirium in half up to 1 week after surgery.                  [facility]    Preoperative Deep Breathing Exercises    Why it is important to do deep breathing exercises before my surgery?  Deep breathing exercises strengthen your breathing muscles.  This helps you to recover after your surgery and decreases the chance of breathing complications.      How are the deep breathing exercises done?  Sit straight with your back supported.  Breathe in deeply and slowly through your nose. Your lower rib cage should expand and your abdomen may move forward.  Hold that breath for 3 to 5 seconds.  Breathe out through pursed  lips, slowly and completely.  Rest and repeat 10 times every hour while awake.  Rest longer if you become dizzy or lightheaded.      Patient Information: Incentive Spirometer  What is an incentive spirometer?  An incentive spirometer is a device used before and after surgery to “exercise” your lungs.  It helps you to take deeper breaths to expand your lungs.  Below is an example of a basic incentive spirometer.  The device you receive may differ slightly but they all function the same.    Why do I need to use an incentive spirometer?  Using your incentive spirometer prepares your lungs for surgery and helps prevent lung problems after surgery.  How do I use my incentive spirometer?  When you're using your incentive spirometer, make sure to breathe through your mouth. If you breathe through your nose, the incentive spirometer won't work properly. You can hold your nose if you have trouble.  If you feel dizzy at any time, stop and rest. Try again at a later time.  Follow the steps below:  Set up your incentive spirometer, expand the flexible tubing and connect to the outlet.  Sit upright in a chair or bed. Hold the incentive spirometer at eye level.   Put the mouthpiece in your mouth and close your lips tightly around it. Slowly breathe out (exhale) completely.  Breathe in (inhale) slowly through your mouth as deeply as you can. As you take a breath, you will see the piston rise inside the large column. While the piston rises, the indicator should move upwards. It should stay in between the 2 arrows (see Figure).  Try to get the piston as high as you can, while keeping the indicator between the arrows.   If the indicator doesn't stay between the arrows, you're breathing either too fast or too slow.  When you get it as high as you can, hold your breath for 10 seconds, or as long as possible. While you're holding your breath, the piston will slowly fall to the base of the spirometer.  Once the piston reaches the bottom  of the spirometer, breathe out slowly through your mouth. Rest for a few seconds.  Repeat 10 times. Try to get the piston to the same level with each breath.  Repeat every hour while awake  You can carefully clean the outside of the mouthpiece with an alcohol wipe or soap and water.            Patient and Family Education             Ways You Can Help Prevent Blood Clots             This handout explains some simple things you can do to help prevent blood clots.      Blood clots are blockages that can form in the body's veins. When a blood clot forms in your deep veins, it may be called a deep vein thrombosis, or DVT for short. Blood clots can happen in any part of the body where blood flows, but they are most common in the arms and legs. If a piece of a blood clot breaks free and travels to the lungs, it is called a pulmonary embolus (PE). A PE can be a very serious problem.         Being in the hospital or having surgery can raise your chances of getting a blood clot because you may not be well enough to move around as much as you normally do.         Ways you can help prevent blood clots in the hospital         Wearing SCDs. SCDs stands for Sequential Compression Devices.   SCDs are special sleeves that wrap around your legs  They attach to a pump that fills them with air to gently squeeze your legs every few minutes.   This helps return the blood in your legs to your heart.   SCDs should only be taken off when walking or bathing.   SCDs may not be comfortable, but they can help save your life.               Wearing compression stockings - if your doctor orders them. These special snug fitting stockings gently squeeze your legs to help blood flow.       Walking. Walking helps move the blood in your legs.   If your doctor says it is ok, try walking the halls at least   5 times a day. Ask us to help you get up, so you don't fall.      Taking any blood thinning medicines your doctor orders.             ©[facility]; 3/23       Ways you can help prevent blood clots at home       Wearing compression stockings - if your doctor orders them. ? Walking - to help move the blood in your legs.       Taking any blood thinning medicines your doctor orders.      Signs of a blood clot or PE      Tell your doctor or nurse know right away if you have of the problems listed below.    If you are at home, seek medical care right away. Call 911 for chest pain or problems breathing.               Signs of a blood clot (DVT) - such as pain,  swelling, redness or warmth in your arm or leg      Signs of a pulmonary embolism (PE) - such as chest     pain or feeling short of breath           The Week before Surgery        Seven days before Surgery  Check your [facility] medication instructions  Do the exercises provided to you by [facility]   Arrange for a responsible, adult licensed  to take you home after surgery and stay with you for 24 hours.  You will not be permitted to drive yourself home if you have received any anesthetic/sedation  Six days before surgery  Check your [facility] medication instructions  Do the exercises provided to you by [facility]   Start using Chlorhexidene (CHG) body wash if prescribed  Five days before surgery  Check your [facility] medication instructions  Do the exercises provided to you by [facility]   Continue to use CHG body wash if prescribed  Three days before surgery  Check your [facility] medication instructions  Do the exercises provided to you by [facility]   Continue to use CHG body wash if prescribed  Two days before surgery  Check your [facility] medication instructions  Do the exercises provided to you by [facility]   Continue to use CHG body wash if prescribed    The Day before Surgery       Check your [facility] medication and all other [facility] instructions including when to stop eating and drinking  You will be called with your arrival time for surgery in the late afternoon.  If you do not receive a call please reach out to your surgeon's office.  Do not smoke or  drink 24 hours before surgery  Prepare items to bring with you to the hospital  Shower with your chlorhexidine wash if prescribed  Brush your teeth and use your chlorhexidine dental rinse if prescribed    The Day of Surgery       Check your [facility] medication instructions  Ensure you follow the instructions for when to stop eating and drinking  Shower, if prescribed use CHG.  Do not apply any lotions, creams, moisturizers, perfume or deodorant  Brush your teeth and use your CHG dental rinse if prescribed  Wear loose comfortable clothing  Avoid make-up  Remove  jewelry and piercings, consider professional piercing removal with a plastic spacer if needed  Bring photo ID and Insurance card  Bring an accurate medication list that includes medication dose, frequency and allergies  Bring a copy of your advanced directives (will, health care power of )  Bring any devices and controllers as well as medical devices you have been provided with for surgery (CPAP, slings, braces, etc.)  Dentures, eyeglasses, and contacts will be removed before surgery, please bring cases for contacts or glasses        Patient Information: Pre-Operative Infection Prevention Measures     Why did I have my nose, under my arms, and groin swabbed?  The purpose of the swab is to identify Staphylococcus aureus inside your nose or on your skin.  The swab was sent to the laboratory for culture.  A positive swab/culture for Staphylococcus aureus is called colonization or carriage.      What is Staphylococcus aureus?  Staphylococcus aureus, also known as “staph”, is a germ found on the skin or in the nose of healthy people.  Sometimes Staphylococcus aureus can get into the body and cause an infection.  This can be minor (such as pimples, boils, or other skin problems).  It might also be serious (such as a blood infection, pneumonia, or a surgical site infection).    What is Staphylococcus aureus colonization or carriage?  Colonization or carriage  means that a person has the germ but is not sick from it.  These bacteria can be spread on the hands or when breathing or sneezing.    How is Staphylococcus aureus spread?  It is most often spread by close contact with a person or item that carries it.    What happens if my culture is positive for Staphylococcus aureus?  Your doctor/medical team will use this information to guide any antibiotic treatment which may be necessary.  Regardless of the culture results, we will clean the inside of your nose with a betadine swab just before you have your surgery.      Will I get an infection if I have Staphylococcus aureus in my nose or on my skin?  Anyone can get an infection with Staphylococcus aureus.  However, the best way to reduce your risk of infection is to follow the instructions provided to you for the use of your CHG soap and dental rinse.        Patient Information: Oral/Dental Rinse    What is oral/dental rinse?   It is a mouthwash. It is a way of cleaning the mouth with a germ-killing solution before your surgery.  The solution contains chlorhexidine, commonly known as CHG.   It is used inside the mouth to kill a bacteria known as Staphylococcus aureus.  Let your doctor know if you are allergic to Chlorhexidine.    Why do I need to use CHG oral/dental rinse?  The CHG oral/dental rinse helps to kill a bacteria in your mouth known as Staphylococcus aureus.     This reduces the risk of infection at the surgical site.      Using your CHG oral/dental rinse  STEPS:  Use your CHG oral/dental rinse after you brush your teeth the night before (at bedtime) and the morning of your surgery.  Follow all directions on your prescription label.    Use the cap on the container to measure 15ml   Swish (gargle if you can) the mouthwash in your mouth for at least 30 seconds, (do not swallow) and spit out  After you use your CHG rinse, do not rinse your mouth with water, drink or eat.  Please refer to the prescription label for  the appropriate time to resume oral intake      What side effects might I have using the CHG oral/dental rinse?  CHG rinse will stick to plaque on the teeth.  Brush and floss just before use.  Teeth brushing will help avoid staining of plaque during use.      Patient Information: Home Preoperative Antibacterial Shower      What is a home preoperative antibacterial shower?  This shower is a way of cleaning the skin with a germ-killing solution before surgery.  The solution contains chlorhexidine, commonly known as CHG.  CHG is a skin cleanser with germ-killing ability.  Let your doctor know if you are allergic to chlorhexidine.    Why do I need to take a preoperative antibacterial shower?  Skin is not sterile.  It is best to try to make your skin as free of germs as possible before surgery.  Proper cleansing with a germ-killing soap before surgery can lower the number of germs on your skin.  This helps to reduce the risk of infection at the surgical site.  Following the instructions listed below will help you prepare your skin for surgery.      How do I use the solution?  Steps:  Begin using your CHG soap 5 days before your scheduled surgery on ______3/29/25__________________.    First, wash and rinse your hair using the CHG soap. Keep CHG soap away from ear canals and eyes.  Rinse completely, do not condition.  Hair extensions should be removed.  Wash your face with your normal soap and rinse.    Apply the CHG solution to a clean wet washcloth.  Turn the water off or move away from the water spray to avoid premature rinsing of the CHG soap as you are applying.   Firmly lather your entire body from the neck down.  Do not use on your face.  Pay special attention to the area(s) where your incision(s) will be located unless they are on your face.  Avoid scrubbing your skin too hard.  The important point is to have the CHG soap sit on your skin for 3 minutes.    When the 3 minutes are up, turn on the water and rinse the  CHG solution off your body completely.   DO NOT wash with regular soap after you have used the CHG soap solution  Pat yourself dry with a clean, freshly-laundered towel.  DO NOT apply powders, deodorants, or lotions.  Dress in clean, freshly laundered nightclothes.    Be sure to sleep with clean, freshly laundered sheets.  Be aware that CHG will cause stains on fabrics; if you wash them with bleach after use.  Rinse your washcloth and other linens that have contact with CHG completely.  Use only non-chlorine detergents to launder the items used.   The morning of surgery is the fifth day.  Repeat the above steps and dress in clean comfortable clothing     Whom should I contact if I have any questions regarding the use of CHG soap?  Call the [facility]

## 2025-03-14 NOTE — CPM/PAT H&P
CPM/PAT Evaluation       Name: Lydia Limon (Lydia Limon)  /Age: 1945/79 y.o.     Visit Type:   In-Person       Chief Complaint: left knee pain    HPI: Patient is a 80 yo F scheduled for left total knee replacement on 2025 with Dr. Kaur secondary to left knee osteoarthritis.  Patient was referred by Dr. Kaur  to CPM today for perioperative risk stratification and optimization. Patient's PMHx is notable for HLD, hearing loss.       Past Medical History:   Diagnosis Date    Arthritis     Cataract     Hearing aid worn     Rt side    HL (hearing loss)     Lt ear complete loss of hearing, RT ear w/ hearing aid    Hyperlipidemia     Metatarsalgia, unspecified foot     Metatarsalgia    Personal history of diseases of the skin and subcutaneous tissue     History of hyperkeratosis of skin       Past Surgical History:   Procedure Laterality Date    OTHER SURGICAL HISTORY Left 10/14/2013    Ear Surgery    OTHER SURGICAL HISTORY  2020    Parathyroidectomy    TUBAL LIGATION  2017    Tubal Ligation       Patient  has no history on file for sexual activity.    Family History   Problem Relation Name Age of Onset    Aortic dissection Mother      Heart disease Father      Heart attack Neg Hx      Stroke Neg Hx         No Known Allergies    Prior to Admission medications    Medication Sig Start Date End Date Taking? Authorizing Provider   ascorbic acid, vitamin C, (Sherrie-C) crystals Take by mouth.    Historical Provider, MD   aspirin 81 mg EC tablet Take by mouth.    Historical Provider, MD   atorvastatin (Lipitor) 10 mg tablet Take 1 tablet (10 mg) by mouth once daily. 2/15/24 2/14/25  Cecil Chu MD   calcium carbonate-vitamin D3 (Caltrate 600 plus D) 600 mg-20 mcg (800 unit) tablet,chewable Chew 2 tablets once daily.    Historical Provider, MD   ibuprofen (AdviL) 200 mg tablet every 6 hours.    Historical Provider, MD   multivitamin tablet Take by mouth.    Historical  "Provider, MD   vitamin E 180 mg (400 unit) capsule Take 1 capsule (400 Units) by mouth once daily.    Historical Provider, MD PAULA ROS:   Constitutional:   neg    Neuro/Psych:   neg    Eyes:   neg    Ears:    hearing loss   hearing aides  Nose:   neg    Mouth:   neg    Throat:   neg    Neck:   neg    Cardio:   neg    Respiratory:   neg    Endocrine:   neg    GI:   neg    :   neg    Musculoskeletal:    +left knee pain  Hematologic:   neg    Skin:  neg        Physical Exam  Vitals and nursing note reviewed.   Constitutional:       General: She is not in acute distress.     Appearance: She is not ill-appearing or toxic-appearing.   HENT:      Head: Normocephalic and atraumatic.      Nose: Nose normal.      Mouth/Throat:      Mouth: Mucous membranes are moist.   Eyes:      Conjunctiva/sclera: Conjunctivae normal.   Neck:      Vascular: No carotid bruit.   Cardiovascular:      Rate and Rhythm: Normal rate and regular rhythm.      Heart sounds: Normal heart sounds. No murmur heard.  Pulmonary:      Effort: Pulmonary effort is normal.      Breath sounds: Normal breath sounds.   Abdominal:      General: There is no distension.      Palpations: Abdomen is soft.      Tenderness: There is no abdominal tenderness.   Musculoskeletal:         General: Normal range of motion.      Cervical back: Normal range of motion.   Skin:     General: Skin is warm and dry.      Capillary Refill: Capillary refill takes less than 2 seconds.   Neurological:      General: No focal deficit present.      Mental Status: She is alert.   Psychiatric:         Mood and Affect: Mood normal.         Behavior: Behavior normal.          PAT AIRWAY:   Airway:     Mallampati::  I    TM distance::  >3 FB    Neck ROM::  Full          Visit Vitals  /80   Pulse 76   Temp 36.6 °C (97.9 °F) (Temporal)   Resp 16   Ht 1.651 m (5' 5\")   Wt 49.4 kg (109 lb)   SpO2 100%   BMI 18.14 kg/m²   OB Status Postmenopausal   Smoking Status Never   BSA 1.51 m² "       DASI Risk Score      Flowsheet Row Pre-Admission Testing from 3/14/2025 in [facility]   Can you take care of yourself (eat, dress, bathe, or use toilet)?  2.75 filed at 03/14/2025 1249   Can you walk indoors, such as around your house? 1.75 filed at 03/14/2025 1249   Can you walk a block or two on level ground?  2.75 filed at 03/14/2025 1249   Can you climb a flight of stairs or walk up a hill? 5.5 filed at 03/14/2025 1249   Can you run a short distance? 8 filed at 03/14/2025 1249   Can you do light work around the house like dusting or washing dishes? 2.7 filed at 03/14/2025 1249   Can you do moderate work around the house like vacuuming, sweeping floors or carrying groceries? 3.5 filed at 03/14/2025 1249   Can you do heavy work around the house like scrubbing floors or lifting and moving heavy furniture?  0 filed at 03/14/2025 1249   Can you do yard work like raking leaves, weeding or pushing a mower? 4.5 filed at 03/14/2025 1249   Can you have sexual relations? 5.25 filed at 03/14/2025 1249   Can you participate in moderate recreational activities like golf, bowling, dancing, doubles tennis or throwing a baseball or football? 0 filed at 03/14/2025 1249   Can you participate in strenous sports like swimming, singles tennis, football, basketball, or skiing? 0 filed at 03/14/2025 1249   DASI SCORE 36.7 filed at 03/14/2025 1249   METS Score (Will be calculated only when all the questions are answered) 7.3 filed at 03/14/2025 1249          Caprini DVT Assessment      Flowsheet Row Pre-Admission Testing from 3/14/2025 in [facility]   DVT Score (IF A SCORE IS NOT CALCULATING, MUST SELECT A BMI TO COMPLETE) 9 filed at 03/14/2025 1249   Surgical Factors Elective major lower extremity arthroplasty filed at 03/14/2025 1249   BMI (BMI MUST BE CHOSEN) 30 or less filed at 03/14/2025 1249          Modified Frailty Index      Flowsheet Row Pre-Admission Testing from 3/14/2025 in   [facility]   Non-independent functional status (problems with dressing, bathing, personal grooming, or cooking) 0 filed at 03/14/2025 1250   History of diabetes mellitus  0 filed at 03/14/2025 1250   History of COPD 0 filed at 03/14/2025 1250   History of CHF No filed at 03/14/2025 1250   History of MI 0 filed at 03/14/2025 1250   History of Percutaneous Coronary Intervention, Cardiac Surgery, or Angina No filed at 03/14/2025 1250   Hypertension requiring the use of medication  0 filed at 03/14/2025 1250   Peripheral vascular disease 0 filed at 03/14/2025 1250   Impaired sensorium (cognitive impairement or loss, clouding, or delirium) 0 filed at 03/14/2025 1250   TIA or CVA withouy residual deficit 0 filed at 03/14/2025 1250   Cerebrovascular accident with deficit 0 filed at 03/14/2025 1250   Modified Frailty Index Calculator 0 filed at 03/14/2025 1250          [id number] Stroke Risk Points  Current as of just now        N/A 0 to 9 Points:      Last Change: N/A          The [id number] risk score (Lip GH, et al. 2009. © 2010 [employer]) quantifies the risk of stroke for a patient with atrial fibrillation. For patients without atrial fibrillation or under the age of 18 this score appears as N/A. Higher score values generally indicate higher risk of stroke.        This score is not applicable to this patient. Components are not calculated.          Revised Cardiac Risk Index      Flowsheet Row Pre-Admission Testing from 3/14/2025 in [facility]   High-Risk Surgery (Intraperitoneal, Intrathoracic,Suprainguinal vascular) 0 filed at 03/14/2025 1250   History of ischemic heart disease (History of MI, History of positive exercuse test, Current chest paint considered due to myocardial ischemia, Use of nitrate therapy, ECG with pathological Q Waves) 0 filed at 03/14/2025 1250   History of congestive heart failure (pulmonary edemia, bilateral rales or S3 gallop,  Paroxysmal nocturnal dyspnea, CXR showing pulmonary vascular redistribution) 0 filed at 03/14/2025 1250   History of cerebrovascular disease (Prior TIA or stroke) 0 filed at 03/14/2025 1250   Pre-operative insulin treatment 0 filed at 03/14/2025 1250   Pre-operative creatinine>2 mg/dl 0 filed at 03/14/2025 1250   Revised Cardiac Risk Calculator 0 filed at 03/14/2025 1250          Apfel Simplified Score      Flowsheet Row Pre-Admission Testing from 3/14/2025 in [facility]   Smoking status 1 filed at 03/14/2025 1250   History of motion sickness or PONV  1 filed at 03/14/2025 1250   Use of postoperative opioids 0 filed at 03/14/2025 1250   Gender - Female 1=Yes filed at 03/14/2025 1250   Apfel Simplified Score Calculator 3 filed at 03/14/2025 1250          Risk Analysis Index Results This Encounter    No data found in the last 10 encounters.       Stop Bang Score      Flowsheet Row Pre-Admission Testing from 3/14/2025 in [facility]   Do you snore loudly? 0 filed at 03/14/2025 1229   Do you often feel tired or fatigued after your sleep? 0 filed at 03/14/2025 1229   Has anyone ever observed you stop breathing in your sleep? 0 filed at 03/14/2025 1229   Do you have or are you being treated for high blood pressure? 0 filed at 03/14/2025 1229   Recent BMI (Calculated) 18.1 filed at 03/14/2025 1229   Is BMI greater than 35 kg/m2? 0=No filed at 03/14/2025 1229   Age older than 50 years old? 1=Yes filed at 03/14/2025 1229   Is your neck circumference greater than 17 inches (Male) or 16 inches (Female)? 0 filed at 03/14/2025 1229   Gender - Male 0=No filed at 03/14/2025 1229   STOP-BANG Total Score 1 filed at 03/14/2025 1229          Prodigy: High Risk  Total Score: 12              Prodigy Age Score           ARISCAT Score for Postoperative Pulmonary Complications      Flowsheet Row Pre-Admission Testing from 3/14/2025 in [facility]   Age Calculated Score 3 filed at 03/14/2025  1350   Preoperative SpO2 0 filed at 03/14/2025 1350   Respiratory infection in the last month Either upper or lower (i.e., URI, bronchitis, pneumonia), with fever and antibiotic treatment 0 filed at 03/14/2025 1350   Preoperative anemia (Hgb less than 10 g/dl) 0 filed at 03/14/2025 1350   Surgical incision  0 filed at 03/14/2025 1350   Duration of surgery  16 filed at 03/14/2025 1350   Emergency Procedure  0 filed at 03/14/2025 1350   ARISCAT Total Score  19 filed at 03/14/2025 1350          Douglas Perioperative Risk for Myocardial Infarction or Cardiac Arrest (DENNIS)      Flowsheet Row Pre-Admission Testing from 3/14/2025 in [facility]   Calculated Age Score 1.54 filed at 03/14/2025 1350   Functional Status  0 filed at 03/14/2025 1350   ASA Class  -5.17 filed at 03/14/2025 1350   Creatinine 0 filed at 03/14/2025 1350   Type of Procedure  0.80 filed at 03/14/2025 1350   DENNIS Total Score  -8.08 filed at 03/14/2025 1350   DENNIS % 0.03 filed at 03/14/2025 1350            Assessment & Plan    Neuro:   No neurologic diagnoses, however, the patient is at an increased risk for post operative delirium secondary to age >/= 65.  Preoperative brain exercise educational handout provided to patient.    The patient is at an increased risk for perioperative stroke secondary to increased age, HLD, and female sex .    HEENT/Airway:   No diagnosis or significant findings on chart review or clinical presentation and evaluation.   STOP-BANG Score- 1 points lowrisk for CHRIS    Mallampati::  I    TM distance::  >3 FB    Neck ROM::  Full  Dentures-denies  Crowns-denies   Implants-reports - lower and upper    Cardiovascular:    -HLD - on statin (continue)  No additional preoperative testing is currently indicated.  METS: 7.3, limited by knee pain  RCRI: 0 points, 3.9%    30 day risk of MACE (risk for cardiac death, nonfatal myocardial infarction, and nonfactal cardiac arrest  DENNIS:  0.03  % risk of intraoperative or 30-day  postoperative MACE  EKG - reviewed from 2/10/25 normal sinus rate 65  Patient sees Dr. Chu for cardiology who notes patient is clear for surgery, low cardiac risk, see note from 2/10/25    Pulmonary:     No diagnosis or significant findings on chart review or clinical presentation and evaluation.   ARISCAT: <26 points, 1.6% risk of in-hospital postoperative pulmonary complication  PRODIGY: Moderate risk for opioid induced respiratory depression  Smoking History-She has never smoked.  Preoperative deep breathing educational handout provided to patient.    Renal:  No diagnosis or significant findings on chart review or clinical presentation and evaluation, however, the patient is at increased risk of perioperative renal complications secondary to age>/= 56. Preventative measures include BP monitoring, medication compliance, and hydration management.   CMP-Pending    Endocrine:  No diagnosis or significant findings on chart review or clinical presentation and evaluation.     Hematologic:    No diagnosis or significant findings on chart review or clinical presentation and evaluation.  The patient is not a Jehovah’s witness and will accept blood and blood products if medically indicated.   History of previous blood transfusions No  CBC-Pending    Caprini Score 9, patient at High for postoperative DVT. Pt supplied education/VTE handout  Anticoagulation use: No - takes asa 81mg for preventative  Preoperative DVT educational handout provided to patient.    Gastrointestinal:     No diagnosis or significant findings on chart review or clinical presentation and evaluation.   Recreational drug use: Drug use No  Alcohol use social drinker    Apfel: 3 points 61% risk for post operative N/V    Infectious disease:    No diagnosis or significant findings on chart review or clinical presentation and evaluation.   Prescription provided for CHG body wash and dental rinse. CHG use instructions reviewed and provided to patient.  Patient advised to call [facility] if rx not received.   Staph screen collected-Pending    Musculoskeletal:    -osteoarthritis of left knee - scheduled for surgery    Anesthesia:  ASA 1 - Normal health patient    History of General anesthesia- yes  Complications- No anesthesia complications  No family history of anesthesia complications    Nickel/metal allergy-negative  Shellfish allergy-negative    Discussed with patient medication instructions, NPO guidelines, and any questions or concerns. Patient does not need further workup prior to preceding with elective surgery based on based on risk assessment.       Prerna Lock PA-C 3/14/2025 1:54 PM      Pending labs ordered:  cbc, comp, A1c, and MSSA/MRSA culture  Follow up needed: labs

## 2025-03-14 NOTE — H&P (VIEW-ONLY)
CPM/PAT Evaluation       Name: Lydia Limon (Lydia Limon)  /Age: 1945/79 y.o.     Visit Type:   In-Person       Chief Complaint: left knee pain    HPI: Patient is a 78 yo F scheduled for left total knee replacement on 2025 with Dr. Kaur secondary to left knee osteoarthritis.  Patient was referred by Dr. Kaur  to CPM today for perioperative risk stratification and optimization. Patient's PMHx is notable for HLD, hearing loss.       Past Medical History:   Diagnosis Date    Arthritis     Cataract     Hearing aid worn     Rt side    HL (hearing loss)     Lt ear complete loss of hearing, RT ear w/ hearing aid    Hyperlipidemia     Metatarsalgia, unspecified foot     Metatarsalgia    Personal history of diseases of the skin and subcutaneous tissue     History of hyperkeratosis of skin       Past Surgical History:   Procedure Laterality Date    OTHER SURGICAL HISTORY Left 10/14/2013    Ear Surgery    OTHER SURGICAL HISTORY  2020    Parathyroidectomy    TUBAL LIGATION  2017    Tubal Ligation       Patient  has no history on file for sexual activity.    Family History   Problem Relation Name Age of Onset    Aortic dissection Mother      Heart disease Father      Heart attack Neg Hx      Stroke Neg Hx         No Known Allergies    Prior to Admission medications    Medication Sig Start Date End Date Taking? Authorizing Provider   ascorbic acid, vitamin C, (Sherrie-C) crystals Take by mouth.    Historical Provider, MD   aspirin 81 mg EC tablet Take by mouth.    Historical Provider, MD   atorvastatin (Lipitor) 10 mg tablet Take 1 tablet (10 mg) by mouth once daily. 2/15/24 2/14/25  Cecil Chu MD   calcium carbonate-vitamin D3 (Caltrate 600 plus D) 600 mg-20 mcg (800 unit) tablet,chewable Chew 2 tablets once daily.    Historical Provider, MD   ibuprofen (AdviL) 200 mg tablet every 6 hours.    Historical Provider, MD   multivitamin tablet Take by mouth.    Historical  "Provider, MD   vitamin E 180 mg (400 unit) capsule Take 1 capsule (400 Units) by mouth once daily.    Historical Provider, MD PAULA ROS:   Constitutional:   neg    Neuro/Psych:   neg    Eyes:   neg    Ears:    hearing loss   hearing aides  Nose:   neg    Mouth:   neg    Throat:   neg    Neck:   neg    Cardio:   neg    Respiratory:   neg    Endocrine:   neg    GI:   neg    :   neg    Musculoskeletal:    +left knee pain  Hematologic:   neg    Skin:  neg        Physical Exam  Vitals and nursing note reviewed.   Constitutional:       General: She is not in acute distress.     Appearance: She is not ill-appearing or toxic-appearing.   HENT:      Head: Normocephalic and atraumatic.      Nose: Nose normal.      Mouth/Throat:      Mouth: Mucous membranes are moist.   Eyes:      Conjunctiva/sclera: Conjunctivae normal.   Neck:      Vascular: No carotid bruit.   Cardiovascular:      Rate and Rhythm: Normal rate and regular rhythm.      Heart sounds: Normal heart sounds. No murmur heard.  Pulmonary:      Effort: Pulmonary effort is normal.      Breath sounds: Normal breath sounds.   Abdominal:      General: There is no distension.      Palpations: Abdomen is soft.      Tenderness: There is no abdominal tenderness.   Musculoskeletal:         General: Normal range of motion.      Cervical back: Normal range of motion.   Skin:     General: Skin is warm and dry.      Capillary Refill: Capillary refill takes less than 2 seconds.   Neurological:      General: No focal deficit present.      Mental Status: She is alert.   Psychiatric:         Mood and Affect: Mood normal.         Behavior: Behavior normal.          PAT AIRWAY:   Airway:     Mallampati::  I    TM distance::  >3 FB    Neck ROM::  Full          Visit Vitals  /80   Pulse 76   Temp 36.6 °C (97.9 °F) (Temporal)   Resp 16   Ht 1.651 m (5' 5\")   Wt 49.4 kg (109 lb)   SpO2 100%   BMI 18.14 kg/m²   OB Status Postmenopausal   Smoking Status Never   BSA 1.51 m² "       DASI Risk Score      Flowsheet Row Pre-Admission Testing from 3/14/2025 in [facility]   Can you take care of yourself (eat, dress, bathe, or use toilet)?  2.75 filed at 03/14/2025 1249   Can you walk indoors, such as around your house? 1.75 filed at 03/14/2025 1249   Can you walk a block or two on level ground?  2.75 filed at 03/14/2025 1249   Can you climb a flight of stairs or walk up a hill? 5.5 filed at 03/14/2025 1249   Can you run a short distance? 8 filed at 03/14/2025 1249   Can you do light work around the house like dusting or washing dishes? 2.7 filed at 03/14/2025 1249   Can you do moderate work around the house like vacuuming, sweeping floors or carrying groceries? 3.5 filed at 03/14/2025 1249   Can you do heavy work around the house like scrubbing floors or lifting and moving heavy furniture?  0 filed at 03/14/2025 1249   Can you do yard work like raking leaves, weeding or pushing a mower? 4.5 filed at 03/14/2025 1249   Can you have sexual relations? 5.25 filed at 03/14/2025 1249   Can you participate in moderate recreational activities like golf, bowling, dancing, doubles tennis or throwing a baseball or football? 0 filed at 03/14/2025 1249   Can you participate in strenous sports like swimming, singles tennis, football, basketball, or skiing? 0 filed at 03/14/2025 1249   DASI SCORE 36.7 filed at 03/14/2025 1249   METS Score (Will be calculated only when all the questions are answered) 7.3 filed at 03/14/2025 1249          Caprini DVT Assessment      Flowsheet Row Pre-Admission Testing from 3/14/2025 in [facility]   DVT Score (IF A SCORE IS NOT CALCULATING, MUST SELECT A BMI TO COMPLETE) 9 filed at 03/14/2025 1249   Surgical Factors Elective major lower extremity arthroplasty filed at 03/14/2025 1249   BMI (BMI MUST BE CHOSEN) 30 or less filed at 03/14/2025 1249          Modified Frailty Index      Flowsheet Row Pre-Admission Testing from 3/14/2025 in   [facility]   Non-independent functional status (problems with dressing, bathing, personal grooming, or cooking) 0 filed at 03/14/2025 1250   History of diabetes mellitus  0 filed at 03/14/2025 1250   History of COPD 0 filed at 03/14/2025 1250   History of CHF No filed at 03/14/2025 1250   History of MI 0 filed at 03/14/2025 1250   History of Percutaneous Coronary Intervention, Cardiac Surgery, or Angina No filed at 03/14/2025 1250   Hypertension requiring the use of medication  0 filed at 03/14/2025 1250   Peripheral vascular disease 0 filed at 03/14/2025 1250   Impaired sensorium (cognitive impairement or loss, clouding, or delirium) 0 filed at 03/14/2025 1250   TIA or CVA withouy residual deficit 0 filed at 03/14/2025 1250   Cerebrovascular accident with deficit 0 filed at 03/14/2025 1250   Modified Frailty Index Calculator 0 filed at 03/14/2025 1250          [id number] Stroke Risk Points  Current as of just now        N/A 0 to 9 Points:      Last Change: N/A          The [id number] risk score (Lip GH, et al. 2009. © 2010 American College [employer]) quantifies the risk of stroke for a patient with atrial fibrillation. For patients without atrial fibrillation or under the age of 18 this score appears as N/A. Higher score values generally indicate higher risk of stroke.        This score is not applicable to this patient. Components are not calculated.          Revised Cardiac Risk Index      Flowsheet Row Pre-Admission Testing from 3/14/2025 in [facility]   High-Risk Surgery (Intraperitoneal, Intrathoracic,Suprainguinal vascular) 0 filed at 03/14/2025 1250   History of ischemic heart disease (History of MI, History of positive exercuse test, Current chest paint considered due to myocardial ischemia, Use of nitrate therapy, ECG with pathological Q Waves) 0 filed at 03/14/2025 1250   History of congestive heart failure (pulmonary edemia, bilateral rales or S3 gallop,  Paroxysmal nocturnal dyspnea, CXR showing pulmonary vascular redistribution) 0 filed at 03/14/2025 1250   History of cerebrovascular disease (Prior TIA or stroke) 0 filed at 03/14/2025 1250   Pre-operative insulin treatment 0 filed at 03/14/2025 1250   Pre-operative creatinine>2 mg/dl 0 filed at 03/14/2025 1250   Revised Cardiac Risk Calculator 0 filed at 03/14/2025 1250          Apfel Simplified Score      Flowsheet Row Pre-Admission Testing from 3/14/2025 in [facility]   Smoking status 1 filed at 03/14/2025 1250   History of motion sickness or PONV  1 filed at 03/14/2025 1250   Use of postoperative opioids 0 filed at 03/14/2025 1250   Gender - Female 1=Yes filed at 03/14/2025 1250   Apfel Simplified Score Calculator 3 filed at 03/14/2025 1250          Risk Analysis Index Results This Encounter    No data found in the last 10 encounters.       Stop Bang Score      Flowsheet Row Pre-Admission Testing from 3/14/2025 in [facility]   Do you snore loudly? 0 filed at 03/14/2025 1229   Do you often feel tired or fatigued after your sleep? 0 filed at 03/14/2025 1229   Has anyone ever observed you stop breathing in your sleep? 0 filed at 03/14/2025 1229   Do you have or are you being treated for high blood pressure? 0 filed at 03/14/2025 1229   Recent BMI (Calculated) 18.1 filed at 03/14/2025 1229   Is BMI greater than 35 kg/m2? 0=No filed at 03/14/2025 1229   Age older than 50 years old? 1=Yes filed at 03/14/2025 1229   Is your neck circumference greater than 17 inches (Male) or 16 inches (Female)? 0 filed at 03/14/2025 1229   Gender - Male 0=No filed at 03/14/2025 1229   STOP-BANG Total Score 1 filed at 03/14/2025 1229          Prodigy: High Risk  Total Score: 12              Prodigy Age Score           ARISCAT Score for Postoperative Pulmonary Complications      Flowsheet Row Pre-Admission Testing from 3/14/2025 in [facility]   Age Calculated Score 3 filed at 03/14/2025  1350   Preoperative SpO2 0 filed at 03/14/2025 1350   Respiratory infection in the last month Either upper or lower (i.e., URI, bronchitis, pneumonia), with fever and antibiotic treatment 0 filed at 03/14/2025 1350   Preoperative anemia (Hgb less than 10 g/dl) 0 filed at 03/14/2025 1350   Surgical incision  0 filed at 03/14/2025 1350   Duration of surgery  16 filed at 03/14/2025 1350   Emergency Procedure  0 filed at 03/14/2025 1350   ARISCAT Total Score  19 filed at 03/14/2025 1350          Douglas Perioperative Risk for Myocardial Infarction or Cardiac Arrest (DENNIS)      Flowsheet Row Pre-Admission Testing from 3/14/2025 in [facility]   Calculated Age Score 1.54 filed at 03/14/2025 1350   Functional Status  0 filed at 03/14/2025 1350   ASA Class  -5.17 filed at 03/14/2025 1350   Creatinine 0 filed at 03/14/2025 1350   Type of Procedure  0.80 filed at 03/14/2025 1350   DENNIS Total Score  -8.08 filed at 03/14/2025 1350   DENNIS % 0.03 filed at 03/14/2025 1350            Assessment & Plan    Neuro:   No neurologic diagnoses, however, the patient is at an increased risk for post operative delirium secondary to age >/= 65.  Preoperative brain exercise educational handout provided to patient.    The patient is at an increased risk for perioperative stroke secondary to increased age, HLD, and female sex .    HEENT/Airway:   No diagnosis or significant findings on chart review or clinical presentation and evaluation.   STOP-BANG Score- 1 points lowrisk for CHRSI    Mallampati::  I    TM distance::  >3 FB    Neck ROM::  Full  Dentures-denies  Crowns-denies   Implants-reports - lower and upper    Cardiovascular:    -HLD - on statin (continue)  No additional preoperative testing is currently indicated.  METS: 7.3, limited by knee pain  RCRI: 0 points, 3.9%    30 day risk of MACE (risk for cardiac death, nonfatal myocardial infarction, and nonfactal cardiac arrest  DENNIS:  0.03  % risk of intraoperative or 30-day  postoperative MACE  EKG - reviewed from 2/10/25 normal sinus rate 65  Patient sees Dr. hCu for cardiology who notes patient is clear for surgery, low cardiac risk, see note from 2/10/25    Pulmonary:     No diagnosis or significant findings on chart review or clinical presentation and evaluation.   ARISCAT: <26 points, 1.6% risk of in-hospital postoperative pulmonary complication  PRODIGY: Moderate risk for opioid induced respiratory depression  Smoking History-She has never smoked.  Preoperative deep breathing educational handout provided to patient.    Renal:  No diagnosis or significant findings on chart review or clinical presentation and evaluation, however, the patient is at increased risk of perioperative renal complications secondary to age>/= 56. Preventative measures include BP monitoring, medication compliance, and hydration management.   CMP- WNL    Endocrine:  No diagnosis or significant findings on chart review or clinical presentation and evaluation.   A1c 5.5    Hematologic:    No diagnosis or significant findings on chart review or clinical presentation and evaluation.  The patient is not a Jehovah’s witness and will accept blood and blood products if medically indicated.   History of previous blood transfusions No  CBC- at baseline, messaged patient's PCP with results    Caprini Score 9, patient at High for postoperative DVT. Pt supplied education/VTE handout  Anticoagulation use: No - takes asa 81mg for preventative  Preoperative DVT educational handout provided to patient.    Gastrointestinal:     No diagnosis or significant findings on chart review or clinical presentation and evaluation.   Recreational drug use: Drug use No  Alcohol use social drinker    Apfel: 3 points 61% risk for post operative N/V    Infectious disease:    No diagnosis or significant findings on chart review or clinical presentation and evaluation.   Prescription provided for CHG body wash and dental rinse. CHG use  instructions reviewed and provided to patient. Patient advised to call [facility] if rx not received.   Staph screen collected-negative    Musculoskeletal:    -osteoarthritis of left knee - scheduled for surgery    Anesthesia:  ASA 1 - Normal health patient    History of General anesthesia- yes  Complications- No anesthesia complications  No family history of anesthesia complications    Nickel/metal allergy-negative  Shellfish allergy-negative    Discussed with patient medication instructions, NPO guidelines, and any questions or concerns. Patient does not need further workup prior to preceding with elective surgery based on based on risk assessment.       Prerna Lock PA-C 3/14/2025 1:54 PM

## 2025-03-14 NOTE — GROUP NOTE
In addition to the group class activities, Lydia JAIMES Braxton had the following lab work completed:  No orders of the defined types were placed in this encounter.      A new History and Physical was not completed.    This class lasted approximately 1 hour and had 6 participants. The nurse instructor covered the following topics:    MyChart Enrollment  Communication with Care Team  My Chart is the best form of communication to reach all of your caregivers  You can send messages to specific care givers, or a care team  Continued Education  You will be enrolled in a Joint Replacement care plan to receive additional education before and after surgery  You can review a short recording of the class content - [email or web address]  Access to Medical Records  You can access test results, office notes, appointments, etc.  You can connect to other healthcare systems who use [employer] ([facility], [facility], [facility], etc.)  [employer]  Program Information  Opportunity to Opt Out    Background/Understanding of Joint Replacement Surgery  Potential for same day discharge  Any questions or concerns to be directed to the surgeon's office  Not all patients are appropriate for same day discharge  All patients will be required to meet discharge criteria prior to leaving our care    How to Prepare for Surgery  Use of Recreational Products (Nicotine, Alcohol, THC, CBD, Drugs, Etc.)  The ultimate goal is to quit using thee products!  Stop several weeks before surgery  Such products slow down the healing process and increase risk of post-op infection and complications  Clearance for Surgery  Preadmission Testing - Appropriateness for anesthesia  Medical Clearance by Specialists  Dental Clearance  Cracked/Broken/Loose teeth left untreated may postpone surgery  The importance of post-op antibiotics for dental visits per surgeon protocol  Preadmission Testing  **Potential for postponed surgery if appropriate clearance is not  obtained  Medication Instruction  Follow instructions provided by the doctor who prescribes your medication (typically, but not limited to cardiologist)  Preadmission testing will provide additional instructions during your appointment on what to stop and what to take as you get closer to surgery  For clarification of these instructions, please call preadmission testing directly - [phone]  Tips for Preparing the home for discharge from the hospital  Care Partner  Requirement for surgery, the patient must have a plan to have help at home  Potential for postponed surgery if plan for home support cannot be established  Your Care Partner does not need medical training  How the care partner can help after surgery  CHG Body Wash/Mouth Wash  Follow the instructions given at preadmission testing  Body wash is to be used on the body and hair for 5 washes  Mouthwash is to be used the night before and morning of surgery  **This is a system-wide protocol developed by infectious disease professionals, we will not alter our recommendations for those with sensitive skin or those who have special hair needs.  Please follow the instructions as they are written as this will provide the best infection prevention measures for surgery.  Should you have an allergy to one of the products, please discuss with your preadmission team**    What to Expect in the Hospital/At Home  Morning of Surgery NPO Guidelines  Nothing to eat after midnight  Water can be consumed up to 2 hours prior to arrival  Surgical and Post-Surgical Care Team  Surgical Team  Anesthesia Team  Nursing  Physical Therapy  Care Coordinating  Pharmacy  Hospital Arrival Instructions  Arrive at the time provided to you  Consider traffic patterns (rush-hour) based on arrival time  Have arrangements made for a ride home  If discharging same day, care partner should remain at the hospital  Recovering after Surgery  Recovery Room - Visitors are not brought back  Transition to  hospital room - 2nd Floor, Visitors will be directed to your room  The presence of and strategies for controlling surgical pain and swelling  The importance of early mobility  Side effects after surgery  What to expect if staying overnight    Discharge Planning  The intended plan for discharge will be for patients to discharge home  All patients require a care partner (family, friend, neighbor, etc.) to stay with the patient for the first few nights after surgery  The inability to secure help at home may postpone surgery  Home Care Services set up per surgeon order  Physical Therapy  Occupational Therapy  **If desired, private duty care can be arranged by the patient ahead of time**  Outpatient Physical Therapy per surgeon order    Recovering at Home  Wound Care  Follow wound care instructions found in your discharge paperwork  Bandage is water-resistant and you may shower with the bandage  Do not scrub directly over the bandage  Do not submerge in water until cleared (bathtub, hot tub, pool, etc.)    Post-Op Risk Prevention  Infection Prevention  Promptly seek treatment for any infections post-operatively  Routine dental visits must be postponed for 3 months after surgery  Your surgeon may require antibiotics prior to future dental visits  Any concerns for infection not related directly to the knee or the hip should be managed by your primary care provider  Blood Clots  Be sure to complete the course of blood thinning medication as prescribed by your surgeon  Movement every 1-2 hours during the day is encouraged to prevent blood clots  Monitor for signs of blood clots  Wear compression stockings as prescribed by your surgeon  Constipation  Constipation is common following surgery  Drink plenty of fluids  Take stool softener/laxative as prescribed by your surgeon  Move around frequently  Eat foods high in fiber  Fall Prevention  Prepare home ahead of time to clear space to move with walker  Remove throw rugs and  electrical cords from walkways  Install railings near any stairways with more than 2 steps  Use night lights for increased visibility at night  Continue to use your assistive device until cleared by surgeon or physical therapy  Dislocation Prevention - Not all procedures will have dislocation precautions  Follow dislocation precautions provided by your surgeon  It is OK to resume sexual activity about 6 weeks following surgery  Be sure to follow any dislocation precautions assigned    Durable Medical Equipment  Cold Therapy  Breg Cold Therapy Machines  Ice/Gel Packs  Assistive Devices  Folding Walker with Wheels (in the front only)  No Rollators  Crutches if approved by Physical Therapy and Surgeon after surgery  Hip Kits  Raised Toilet Seats  Additional Compression Stockings    Joint Preservation  Healthy Activities when Cleared  Walking  Swimming  Bike Riding  Activities to Avoid  Refrain from repetitive motions which have a high impact on the joint  Gradual Progression  Progress activity slowly, listen to your body  Common Findings - NORMAL after surgery  Clicking/Grinding  Numbness near incision    Physical Therapy  Prehabilitation exercises  START TODAY ON BOTH LEGS  Surgery Specific Precautions  Follow surgery specific precautions found in your discharge paperwork    Follow-Up Visit  All patients will see their surgeon for a follow up visit after surgery  The visit may range from 2-6 weeks after surgery and is surgeon specific      Please don't hesitate to reach out if you have any additional questions or concerns.    Trinh Stevens MBA, BSN, RN-BC, ONC  VERONA Sanchez, RN  Sarah Fajardo, ALIYA  Orthopedic Program Navigators  [facility]   [phone]

## 2025-03-16 LAB — STAPHYLOCOCCUS SPEC CULT: NORMAL

## 2025-03-24 PROBLEM — M17.12 UNILATERAL PRIMARY OSTEOARTHRITIS, LEFT KNEE: Status: ACTIVE | Noted: 2025-02-02

## 2025-03-26 DIAGNOSIS — E78.5 HYPERLIPIDEMIA, UNSPECIFIED HYPERLIPIDEMIA TYPE: ICD-10-CM

## 2025-03-26 RX ORDER — ATORVASTATIN CALCIUM 10 MG/1
10 TABLET, FILM COATED ORAL DAILY
Qty: 90 TABLET | Refills: 3 | Status: SHIPPED | OUTPATIENT
Start: 2025-03-26

## 2025-04-01 ENCOUNTER — ANESTHESIA EVENT (OUTPATIENT)
Dept: OPERATING ROOM | Facility: HOSPITAL | Age: 80
End: 2025-04-01
Payer: MEDICARE

## 2025-04-02 ENCOUNTER — DOCUMENTATION (OUTPATIENT)
Dept: HOME HEALTH SERVICES | Facility: HOME HEALTH | Age: 80
End: 2025-04-02

## 2025-04-02 ENCOUNTER — HOSPITAL ENCOUNTER (OUTPATIENT)
Facility: HOSPITAL | Age: 80
Discharge: HOME HEALTH | End: 2025-04-03
Attending: ORTHOPAEDIC SURGERY | Admitting: ORTHOPAEDIC SURGERY
Payer: MEDICARE

## 2025-04-02 ENCOUNTER — PHARMACY VISIT (OUTPATIENT)
Dept: PHARMACY | Facility: CLINIC | Age: 80
End: 2025-04-02
Payer: COMMERCIAL

## 2025-04-02 ENCOUNTER — ANESTHESIA (OUTPATIENT)
Dept: OPERATING ROOM | Facility: HOSPITAL | Age: 80
End: 2025-04-02
Payer: MEDICARE

## 2025-04-02 ENCOUNTER — HOME HEALTH ADMISSION (OUTPATIENT)
Dept: HOME HEALTH SERVICES | Facility: HOME HEALTH | Age: 80
End: 2025-04-02
Payer: MEDICARE

## 2025-04-02 DIAGNOSIS — M17.12 UNILATERAL PRIMARY OSTEOARTHRITIS, LEFT KNEE: Primary | ICD-10-CM

## 2025-04-02 DIAGNOSIS — M17.12 PRIMARY OSTEOARTHRITIS OF LEFT KNEE: ICD-10-CM

## 2025-04-02 PROCEDURE — 2780000003 HC OR 278 NO HCPCS: Performed by: ORTHOPAEDIC SURGERY

## 2025-04-02 PROCEDURE — 2500000005 HC RX 250 GENERAL PHARMACY W/O HCPCS: Performed by: PHYSICIAN ASSISTANT

## 2025-04-02 PROCEDURE — 27447 TOTAL KNEE ARTHROPLASTY: CPT | Performed by: ORTHOPAEDIC SURGERY

## 2025-04-02 PROCEDURE — 2500000004 HC RX 250 GENERAL PHARMACY W/ HCPCS (ALT 636 FOR OP/ED): Performed by: PHYSICIAN ASSISTANT

## 2025-04-02 PROCEDURE — 2500000001 HC RX 250 WO HCPCS SELF ADMINISTERED DRUGS (ALT 637 FOR MEDICARE OP): Performed by: PHYSICIAN ASSISTANT

## 2025-04-02 PROCEDURE — A27447 PR TOTAL KNEE ARTHROPLASTY

## 2025-04-02 PROCEDURE — 2720000007 HC OR 272 NO HCPCS: Performed by: ORTHOPAEDIC SURGERY

## 2025-04-02 PROCEDURE — RXMED WILLOW AMBULATORY MEDICATION CHARGE

## 2025-04-02 PROCEDURE — 2500000004 HC RX 250 GENERAL PHARMACY W/ HCPCS (ALT 636 FOR OP/ED): Performed by: ORTHOPAEDIC SURGERY

## 2025-04-02 PROCEDURE — 2500000004 HC RX 250 GENERAL PHARMACY W/ HCPCS (ALT 636 FOR OP/ED): Performed by: ANESTHESIOLOGY

## 2025-04-02 PROCEDURE — 7100000011 HC EXTENDED STAY RECOVERY HOURLY - NURSING UNIT

## 2025-04-02 PROCEDURE — 3700000001 HC GENERAL ANESTHESIA TIME - INITIAL BASE CHARGE: Performed by: ORTHOPAEDIC SURGERY

## 2025-04-02 PROCEDURE — 3600000010 HC OR TIME - EACH INCREMENTAL 1 MINUTE - PROCEDURE LEVEL FIVE: Performed by: ORTHOPAEDIC SURGERY

## 2025-04-02 PROCEDURE — 97110 THERAPEUTIC EXERCISES: CPT | Mod: GP

## 2025-04-02 PROCEDURE — 64447 NJX AA&/STRD FEMORAL NRV IMG: CPT | Performed by: ANESTHESIOLOGY

## 2025-04-02 PROCEDURE — 27447 TOTAL KNEE ARTHROPLASTY: CPT | Performed by: PHYSICIAN ASSISTANT

## 2025-04-02 PROCEDURE — 2500000004 HC RX 250 GENERAL PHARMACY W/ HCPCS (ALT 636 FOR OP/ED)

## 2025-04-02 PROCEDURE — 7100000001 HC RECOVERY ROOM TIME - INITIAL BASE CHARGE: Performed by: ORTHOPAEDIC SURGERY

## 2025-04-02 PROCEDURE — C1776 JOINT DEVICE (IMPLANTABLE): HCPCS | Performed by: ORTHOPAEDIC SURGERY

## 2025-04-02 PROCEDURE — 97161 PT EVAL LOW COMPLEX 20 MIN: CPT | Mod: GP

## 2025-04-02 PROCEDURE — 2500000005 HC RX 250 GENERAL PHARMACY W/O HCPCS

## 2025-04-02 PROCEDURE — A4649 SURGICAL SUPPLIES: HCPCS | Performed by: ORTHOPAEDIC SURGERY

## 2025-04-02 PROCEDURE — C1713 ANCHOR/SCREW BN/BN,TIS/BN: HCPCS | Performed by: ORTHOPAEDIC SURGERY

## 2025-04-02 PROCEDURE — 97116 GAIT TRAINING THERAPY: CPT | Mod: GP

## 2025-04-02 PROCEDURE — 99100 ANES PT EXTEME AGE<1 YR&>70: CPT | Performed by: STUDENT IN AN ORGANIZED HEALTH CARE EDUCATION/TRAINING PROGRAM

## 2025-04-02 PROCEDURE — 99222 1ST HOSP IP/OBS MODERATE 55: CPT | Performed by: EMERGENCY MEDICINE

## 2025-04-02 PROCEDURE — A27447 PR TOTAL KNEE ARTHROPLASTY: Performed by: STUDENT IN AN ORGANIZED HEALTH CARE EDUCATION/TRAINING PROGRAM

## 2025-04-02 PROCEDURE — 3600000005 HC OR TIME - INITIAL BASE CHARGE - PROCEDURE LEVEL FIVE: Performed by: ORTHOPAEDIC SURGERY

## 2025-04-02 PROCEDURE — 7100000002 HC RECOVERY ROOM TIME - EACH INCREMENTAL 1 MINUTE: Performed by: ORTHOPAEDIC SURGERY

## 2025-04-02 PROCEDURE — 3700000002 HC GENERAL ANESTHESIA TIME - EACH INCREMENTAL 1 MINUTE: Performed by: ORTHOPAEDIC SURGERY

## 2025-04-02 DEVICE — TIBAL BASE ATTUNE FB, SZ 4 CEM: Type: IMPLANTABLE DEVICE | Site: KNEE | Status: FUNCTIONAL

## 2025-04-02 DEVICE — SMARTSET HV HIGH VISCOSITY BONE CEMENT 40G
Type: IMPLANTABLE DEVICE | Site: KNEE | Status: FUNCTIONAL
Brand: SMARTSET

## 2025-04-02 DEVICE — ATTUNE PATELLA MEDIALIZED DOME 35MM CEMENTED AOX
Type: IMPLANTABLE DEVICE | Site: KNEE | Status: FUNCTIONAL
Brand: ATTUNE

## 2025-04-02 DEVICE — ATTUNE KNEE SYSTEM TIBIAL INSERT FIXED BEARING POSTERIOR STABILIZED 6 6MM AOX
Type: IMPLANTABLE DEVICE | Site: KNEE | Status: FUNCTIONAL
Brand: ATTUNE

## 2025-04-02 DEVICE — ATTUNE KNEE SYSTEM FEMORAL POSTERIOR STABILIZED NARROW SIZE 6N LEFT CEMENTED
Type: IMPLANTABLE DEVICE | Site: KNEE | Status: FUNCTIONAL
Brand: ATTUNE

## 2025-04-02 RX ORDER — DROPERIDOL 2.5 MG/ML
0.62 INJECTION, SOLUTION INTRAMUSCULAR; INTRAVENOUS ONCE AS NEEDED
Status: DISCONTINUED | OUTPATIENT
Start: 2025-04-02 | End: 2025-04-02 | Stop reason: HOSPADM

## 2025-04-02 RX ORDER — PROPOFOL 10 MG/ML
INJECTION, EMULSION INTRAVENOUS CONTINUOUS PRN
Status: DISCONTINUED | OUTPATIENT
Start: 2025-04-02 | End: 2025-04-02

## 2025-04-02 RX ORDER — ACETAMINOPHEN 325 MG/1
650 TABLET ORAL EVERY 6 HOURS SCHEDULED
Status: DISCONTINUED | OUTPATIENT
Start: 2025-04-02 | End: 2025-04-03 | Stop reason: HOSPADM

## 2025-04-02 RX ORDER — MIDAZOLAM HYDROCHLORIDE 1 MG/ML
1 INJECTION, SOLUTION INTRAMUSCULAR; INTRAVENOUS ONCE
Status: COMPLETED | OUTPATIENT
Start: 2025-04-02 | End: 2025-04-02

## 2025-04-02 RX ORDER — DOCUSATE SODIUM 100 MG/1
100 CAPSULE, LIQUID FILLED ORAL 2 TIMES DAILY
Qty: 60 CAPSULE | Refills: 0 | Status: SHIPPED | OUTPATIENT
Start: 2025-04-02 | End: 2025-05-02

## 2025-04-02 RX ORDER — BISACODYL 10 MG/1
10 SUPPOSITORY RECTAL DAILY PRN
Status: DISCONTINUED | OUTPATIENT
Start: 2025-04-02 | End: 2025-04-03 | Stop reason: HOSPADM

## 2025-04-02 RX ORDER — CYCLOBENZAPRINE HCL 10 MG
5 TABLET ORAL 3 TIMES DAILY PRN
Status: DISCONTINUED | OUTPATIENT
Start: 2025-04-02 | End: 2025-04-03 | Stop reason: HOSPADM

## 2025-04-02 RX ORDER — MIDAZOLAM HYDROCHLORIDE 1 MG/ML
1 INJECTION, SOLUTION INTRAMUSCULAR; INTRAVENOUS ONCE
Status: CANCELLED | OUTPATIENT
Start: 2025-04-02 | End: 2025-04-02

## 2025-04-02 RX ORDER — CEFAZOLIN SODIUM 2 G/100ML
2 INJECTION, SOLUTION INTRAVENOUS ONCE
Status: COMPLETED | OUTPATIENT
Start: 2025-04-02 | End: 2025-04-02

## 2025-04-02 RX ORDER — OXYCODONE HCL 10 MG/1
10 TABLET, FILM COATED, EXTENDED RELEASE ORAL ONCE
Status: COMPLETED | OUTPATIENT
Start: 2025-04-02 | End: 2025-04-02

## 2025-04-02 RX ORDER — HYDROMORPHONE HYDROCHLORIDE 0.2 MG/ML
0.2 INJECTION INTRAMUSCULAR; INTRAVENOUS; SUBCUTANEOUS EVERY 5 MIN PRN
Status: DISCONTINUED | OUTPATIENT
Start: 2025-04-02 | End: 2025-04-02 | Stop reason: HOSPADM

## 2025-04-02 RX ORDER — ONDANSETRON HYDROCHLORIDE 2 MG/ML
4 INJECTION, SOLUTION INTRAVENOUS EVERY 8 HOURS PRN
Status: DISCONTINUED | OUTPATIENT
Start: 2025-04-02 | End: 2025-04-03 | Stop reason: HOSPADM

## 2025-04-02 RX ORDER — FENTANYL CITRATE 50 UG/ML
INJECTION, SOLUTION INTRAMUSCULAR; INTRAVENOUS AS NEEDED
Status: DISCONTINUED | OUTPATIENT
Start: 2025-04-02 | End: 2025-04-02

## 2025-04-02 RX ORDER — PREGABALIN 75 MG/1
75 CAPSULE ORAL ONCE
Status: COMPLETED | OUTPATIENT
Start: 2025-04-02 | End: 2025-04-02

## 2025-04-02 RX ORDER — BISACODYL 5 MG
10 TABLET, DELAYED RELEASE (ENTERIC COATED) ORAL DAILY PRN
Status: DISCONTINUED | OUTPATIENT
Start: 2025-04-02 | End: 2025-04-03 | Stop reason: HOSPADM

## 2025-04-02 RX ORDER — FENTANYL CITRATE 50 UG/ML
50 INJECTION, SOLUTION INTRAMUSCULAR; INTRAVENOUS ONCE
Status: COMPLETED | OUTPATIENT
Start: 2025-04-02 | End: 2025-04-02

## 2025-04-02 RX ORDER — OXYCODONE HYDROCHLORIDE 5 MG/1
5 TABLET ORAL EVERY 6 HOURS PRN
Status: DISCONTINUED | OUTPATIENT
Start: 2025-04-02 | End: 2025-04-03 | Stop reason: HOSPADM

## 2025-04-02 RX ORDER — MELOXICAM 7.5 MG/1
7.5 TABLET ORAL ONCE
Status: COMPLETED | OUTPATIENT
Start: 2025-04-02 | End: 2025-04-02

## 2025-04-02 RX ORDER — MELOXICAM 15 MG/1
15 TABLET ORAL DAILY
Qty: 30 TABLET | Refills: 0 | Status: SHIPPED | OUTPATIENT
Start: 2025-04-02 | End: 2025-05-02

## 2025-04-02 RX ORDER — ASPIRIN 81 MG/1
81 TABLET ORAL 2 TIMES DAILY
Status: DISCONTINUED | OUTPATIENT
Start: 2025-04-03 | End: 2025-04-03 | Stop reason: HOSPADM

## 2025-04-02 RX ORDER — ASPIRIN 81 MG/1
81 TABLET ORAL 2 TIMES DAILY
Qty: 60 TABLET | Refills: 0 | Status: SHIPPED | OUTPATIENT
Start: 2025-04-02 | End: 2025-05-02

## 2025-04-02 RX ORDER — ACETAMINOPHEN 325 MG/1
650 TABLET ORAL EVERY 4 HOURS PRN
Status: DISCONTINUED | OUTPATIENT
Start: 2025-04-02 | End: 2025-04-02 | Stop reason: HOSPADM

## 2025-04-02 RX ORDER — KETOROLAC TROMETHAMINE 15 MG/ML
15 INJECTION, SOLUTION INTRAMUSCULAR; INTRAVENOUS EVERY 6 HOURS
Status: COMPLETED | OUTPATIENT
Start: 2025-04-02 | End: 2025-04-03

## 2025-04-02 RX ORDER — OXYCODONE HYDROCHLORIDE 5 MG/1
10 TABLET ORAL EVERY 4 HOURS PRN
Status: DISCONTINUED | OUTPATIENT
Start: 2025-04-02 | End: 2025-04-02 | Stop reason: HOSPADM

## 2025-04-02 RX ORDER — TRANEXAMIC ACID 100 MG/ML
INJECTION, SOLUTION INTRAVENOUS AS NEEDED
Status: DISCONTINUED | OUTPATIENT
Start: 2025-04-02 | End: 2025-04-02

## 2025-04-02 RX ORDER — CEFAZOLIN SODIUM 2 G/100ML
2 INJECTION, SOLUTION INTRAVENOUS EVERY 8 HOURS
Status: COMPLETED | OUTPATIENT
Start: 2025-04-02 | End: 2025-04-03

## 2025-04-02 RX ORDER — NORETHINDRONE AND ETHINYL ESTRADIOL 0.5-0.035
KIT ORAL AS NEEDED
Status: DISCONTINUED | OUTPATIENT
Start: 2025-04-02 | End: 2025-04-02

## 2025-04-02 RX ORDER — ROPIVACAINE/EPI/CLONIDINE/KET 2.46-0.005
SYRINGE (ML) INJECTION AS NEEDED
Status: DISCONTINUED | OUTPATIENT
Start: 2025-04-02 | End: 2025-04-02 | Stop reason: HOSPADM

## 2025-04-02 RX ORDER — FENTANYL CITRATE 50 UG/ML
50 INJECTION, SOLUTION INTRAMUSCULAR; INTRAVENOUS ONCE
Status: CANCELLED | OUTPATIENT
Start: 2025-04-02 | End: 2025-04-02

## 2025-04-02 RX ORDER — TRAMADOL HYDROCHLORIDE 50 MG/1
50 TABLET ORAL EVERY 6 HOURS PRN
Qty: 28 TABLET | Refills: 0 | Status: SHIPPED | OUTPATIENT
Start: 2025-04-02 | End: 2025-04-09

## 2025-04-02 RX ORDER — OXYCODONE HYDROCHLORIDE 5 MG/1
5 TABLET ORAL EVERY 4 HOURS PRN
Status: DISCONTINUED | OUTPATIENT
Start: 2025-04-02 | End: 2025-04-02 | Stop reason: HOSPADM

## 2025-04-02 RX ORDER — ONDANSETRON 4 MG/1
4 TABLET, ORALLY DISINTEGRATING ORAL EVERY 8 HOURS PRN
Status: DISCONTINUED | OUTPATIENT
Start: 2025-04-02 | End: 2025-04-03 | Stop reason: HOSPADM

## 2025-04-02 RX ORDER — SODIUM CHLORIDE, SODIUM LACTATE, POTASSIUM CHLORIDE, CALCIUM CHLORIDE 600; 310; 30; 20 MG/100ML; MG/100ML; MG/100ML; MG/100ML
50 INJECTION, SOLUTION INTRAVENOUS CONTINUOUS
Status: DISCONTINUED | OUTPATIENT
Start: 2025-04-02 | End: 2025-04-03 | Stop reason: HOSPADM

## 2025-04-02 RX ORDER — ALBUTEROL SULFATE 0.83 MG/ML
2.5 SOLUTION RESPIRATORY (INHALATION) ONCE AS NEEDED
Status: DISCONTINUED | OUTPATIENT
Start: 2025-04-02 | End: 2025-04-02 | Stop reason: HOSPADM

## 2025-04-02 RX ORDER — POLYETHYLENE GLYCOL 3350 17 G/17G
17 POWDER, FOR SOLUTION ORAL DAILY
Status: DISCONTINUED | OUTPATIENT
Start: 2025-04-03 | End: 2025-04-03 | Stop reason: HOSPADM

## 2025-04-02 RX ORDER — OXYCODONE HYDROCHLORIDE 5 MG/1
5 TABLET ORAL EVERY 6 HOURS PRN
Qty: 28 TABLET | Refills: 0 | Status: SHIPPED | OUTPATIENT
Start: 2025-04-02 | End: 2025-04-09

## 2025-04-02 RX ORDER — MIDAZOLAM HYDROCHLORIDE 1 MG/ML
INJECTION, SOLUTION INTRAMUSCULAR; INTRAVENOUS AS NEEDED
Status: DISCONTINUED | OUTPATIENT
Start: 2025-04-02 | End: 2025-04-02

## 2025-04-02 RX ORDER — NALOXONE HYDROCHLORIDE 0.4 MG/ML
0.2 INJECTION, SOLUTION INTRAMUSCULAR; INTRAVENOUS; SUBCUTANEOUS EVERY 5 MIN PRN
Status: DISCONTINUED | OUTPATIENT
Start: 2025-04-02 | End: 2025-04-03 | Stop reason: HOSPADM

## 2025-04-02 RX ORDER — ACETAMINOPHEN 500 MG
1000 TABLET ORAL EVERY 6 HOURS PRN
Qty: 240 TABLET | Refills: 0 | Status: SHIPPED | OUTPATIENT
Start: 2025-04-02 | End: 2025-05-02

## 2025-04-02 RX ORDER — POLYETHYLENE GLYCOL 3350 17 G/17G
17 POWDER, FOR SOLUTION ORAL DAILY
Qty: 30 PACKET | Refills: 0 | Status: SHIPPED | OUTPATIENT
Start: 2025-04-02 | End: 2025-05-02

## 2025-04-02 RX ORDER — SCOPOLAMINE 1 MG/3D
1 PATCH, EXTENDED RELEASE TRANSDERMAL
Status: DISCONTINUED | OUTPATIENT
Start: 2025-04-02 | End: 2025-04-03 | Stop reason: HOSPADM

## 2025-04-02 RX ORDER — DOCUSATE SODIUM 100 MG/1
100 CAPSULE, LIQUID FILLED ORAL 2 TIMES DAILY
Status: DISCONTINUED | OUTPATIENT
Start: 2025-04-02 | End: 2025-04-03 | Stop reason: HOSPADM

## 2025-04-02 RX ORDER — METOCLOPRAMIDE HYDROCHLORIDE 5 MG/ML
10 INJECTION INTRAMUSCULAR; INTRAVENOUS EVERY 6 HOURS PRN
Status: DISCONTINUED | OUTPATIENT
Start: 2025-04-02 | End: 2025-04-03 | Stop reason: HOSPADM

## 2025-04-02 RX ORDER — LIDOCAINE HYDROCHLORIDE 10 MG/ML
INJECTION, SOLUTION INFILTRATION; PERINEURAL AS NEEDED
Status: DISCONTINUED | OUTPATIENT
Start: 2025-04-02 | End: 2025-04-02

## 2025-04-02 RX ORDER — METHOCARBAMOL 100 MG/ML
500 INJECTION, SOLUTION INTRAMUSCULAR; INTRAVENOUS ONCE
Status: DISCONTINUED | OUTPATIENT
Start: 2025-04-02 | End: 2025-04-02 | Stop reason: HOSPADM

## 2025-04-02 RX ORDER — METOCLOPRAMIDE 10 MG/1
10 TABLET ORAL EVERY 6 HOURS PRN
Status: DISCONTINUED | OUTPATIENT
Start: 2025-04-02 | End: 2025-04-03 | Stop reason: HOSPADM

## 2025-04-02 RX ORDER — ACETAMINOPHEN 325 MG/1
975 TABLET ORAL ONCE
Status: COMPLETED | OUTPATIENT
Start: 2025-04-02 | End: 2025-04-02

## 2025-04-02 RX ORDER — OXYCODONE HYDROCHLORIDE 5 MG/1
10 TABLET ORAL EVERY 4 HOURS PRN
Status: DISCONTINUED | OUTPATIENT
Start: 2025-04-02 | End: 2025-04-03 | Stop reason: HOSPADM

## 2025-04-02 RX ORDER — ONDANSETRON HYDROCHLORIDE 2 MG/ML
4 INJECTION, SOLUTION INTRAVENOUS ONCE AS NEEDED
Status: DISCONTINUED | OUTPATIENT
Start: 2025-04-02 | End: 2025-04-02 | Stop reason: HOSPADM

## 2025-04-02 RX ORDER — ATORVASTATIN CALCIUM 20 MG/1
10 TABLET, FILM COATED ORAL DAILY
Status: DISCONTINUED | OUTPATIENT
Start: 2025-04-03 | End: 2025-04-03 | Stop reason: HOSPADM

## 2025-04-02 RX ORDER — PANTOPRAZOLE SODIUM 40 MG/1
40 TABLET, DELAYED RELEASE ORAL
Status: DISCONTINUED | OUTPATIENT
Start: 2025-04-03 | End: 2025-04-03 | Stop reason: HOSPADM

## 2025-04-02 RX ORDER — PANTOPRAZOLE SODIUM 40 MG/1
40 TABLET, DELAYED RELEASE ORAL DAILY
Qty: 30 TABLET | Refills: 0 | Status: SHIPPED | OUTPATIENT
Start: 2025-04-02 | End: 2025-05-02

## 2025-04-02 RX ADMIN — DEXAMETHASONE SODIUM PHOSPHATE 4 MG: 4 INJECTION, SOLUTION INTRAMUSCULAR; INTRAVENOUS at 10:34

## 2025-04-02 RX ADMIN — MIDAZOLAM 1 MG: 1 INJECTION INTRAMUSCULAR; INTRAVENOUS at 10:06

## 2025-04-02 RX ADMIN — SCOPOLAMINE 1 PATCH: 1.5 PATCH, EXTENDED RELEASE TRANSDERMAL at 08:56

## 2025-04-02 RX ADMIN — KETOROLAC TROMETHAMINE 15 MG: 15 INJECTION, SOLUTION INTRAMUSCULAR; INTRAVENOUS at 20:52

## 2025-04-02 RX ADMIN — MEPIVACAINE HYDROCHLORIDE 3.5 ML: 15 INJECTION, SOLUTION EPIDURAL; INFILTRATION at 10:16

## 2025-04-02 RX ADMIN — CEFAZOLIN SODIUM 2 G: 2 INJECTION, SOLUTION INTRAVENOUS at 10:20

## 2025-04-02 RX ADMIN — FENTANYL CITRATE 25 MCG: 0.05 INJECTION, SOLUTION INTRAMUSCULAR; INTRAVENOUS at 10:19

## 2025-04-02 RX ADMIN — TRANEXAMIC ACID 1000 MG: 100 INJECTION, SOLUTION INTRAVENOUS at 11:18

## 2025-04-02 RX ADMIN — EPHEDRINE SULFATE 5 MG: 50 INJECTION, SOLUTION INTRAVENOUS at 10:39

## 2025-04-02 RX ADMIN — PROPOFOL 75 MCG/KG/MIN: 10 INJECTION, EMULSION INTRAVENOUS at 10:20

## 2025-04-02 RX ADMIN — ACETAMINOPHEN 650 MG: 325 TABLET ORAL at 14:55

## 2025-04-02 RX ADMIN — KETOROLAC TROMETHAMINE 15 MG: 15 INJECTION, SOLUTION INTRAMUSCULAR; INTRAVENOUS at 14:55

## 2025-04-02 RX ADMIN — CEFAZOLIN SODIUM 2 G: 2 INJECTION, SOLUTION INTRAVENOUS at 18:16

## 2025-04-02 RX ADMIN — OXYCODONE HYDROCHLORIDE 10 MG: 10 TABLET, FILM COATED, EXTENDED RELEASE ORAL at 08:56

## 2025-04-02 RX ADMIN — ACETAMINOPHEN 650 MG: 325 TABLET ORAL at 20:52

## 2025-04-02 RX ADMIN — TRANEXAMIC ACID 1000 MG: 100 INJECTION, SOLUTION INTRAVENOUS at 10:22

## 2025-04-02 RX ADMIN — EPHEDRINE SULFATE 10 MG: 50 INJECTION, SOLUTION INTRAVENOUS at 11:07

## 2025-04-02 RX ADMIN — EPHEDRINE SULFATE 5 MG: 50 INJECTION, SOLUTION INTRAVENOUS at 10:45

## 2025-04-02 RX ADMIN — PREGABALIN 75 MG: 75 CAPSULE ORAL at 08:56

## 2025-04-02 RX ADMIN — MIDAZOLAM 1 MG: 1 INJECTION INTRAMUSCULAR; INTRAVENOUS at 09:17

## 2025-04-02 RX ADMIN — FENTANYL CITRATE 50 MCG: 0.05 INJECTION, SOLUTION INTRAMUSCULAR; INTRAVENOUS at 09:18

## 2025-04-02 RX ADMIN — ACETAMINOPHEN 975 MG: 325 TABLET ORAL at 08:56

## 2025-04-02 RX ADMIN — MELOXICAM 7.5 MG: 7.5 TABLET ORAL at 08:56

## 2025-04-02 RX ADMIN — EPHEDRINE SULFATE 10 MG: 50 INJECTION, SOLUTION INTRAVENOUS at 10:56

## 2025-04-02 RX ADMIN — SODIUM CHLORIDE, SODIUM LACTATE, POTASSIUM CHLORIDE, AND CALCIUM CHLORIDE 50 ML/HR: 600; 310; 30; 20 INJECTION, SOLUTION INTRAVENOUS at 14:55

## 2025-04-02 RX ADMIN — POVIDONE-IODINE 1 APPLICATION: 5 SOLUTION TOPICAL at 08:56

## 2025-04-02 RX ADMIN — LIDOCAINE HYDROCHLORIDE 30 MG: 10 INJECTION, SOLUTION INFILTRATION; PERINEURAL at 10:19

## 2025-04-02 RX ADMIN — PROPOFOL 30 MG: 10 INJECTION, EMULSION INTRAVENOUS at 10:21

## 2025-04-02 RX ADMIN — SODIUM CHLORIDE, POTASSIUM CHLORIDE, SODIUM LACTATE AND CALCIUM CHLORIDE: 600; 310; 30; 20 INJECTION, SOLUTION INTRAVENOUS at 09:45

## 2025-04-02 SDOH — SOCIAL STABILITY: SOCIAL INSECURITY: ARE YOU OR HAVE YOU BEEN THREATENED OR ABUSED PHYSICALLY, EMOTIONALLY, OR SEXUALLY BY ANYONE?: NO

## 2025-04-02 SDOH — SOCIAL STABILITY: SOCIAL INSECURITY: WERE YOU ABLE TO COMPLETE ALL THE BEHAVIORAL HEALTH SCREENINGS?: YES

## 2025-04-02 SDOH — SOCIAL STABILITY: SOCIAL INSECURITY
WITHIN THE LAST YEAR, HAVE YOU BEEN RAPED OR FORCED TO HAVE ANY KIND OF SEXUAL ACTIVITY BY YOUR PARTNER OR EX-PARTNER?: NO

## 2025-04-02 SDOH — ECONOMIC STABILITY: FOOD INSECURITY: WITHIN THE PAST 12 MONTHS, THE FOOD YOU BOUGHT JUST DIDN'T LAST AND YOU DIDN'T HAVE MONEY TO GET MORE.: NEVER TRUE

## 2025-04-02 SDOH — SOCIAL STABILITY: SOCIAL INSECURITY: DO YOU FEEL UNSAFE GOING BACK TO THE PLACE WHERE YOU ARE LIVING?: NO

## 2025-04-02 SDOH — SOCIAL STABILITY: SOCIAL INSECURITY
WITHIN THE LAST YEAR, HAVE YOU BEEN KICKED, HIT, SLAPPED, OR OTHERWISE PHYSICALLY HURT BY YOUR PARTNER OR EX-PARTNER?: NO

## 2025-04-02 SDOH — SOCIAL STABILITY: SOCIAL INSECURITY: DOES ANYONE TRY TO KEEP YOU FROM HAVING/CONTACTING OTHER FRIENDS OR DOING THINGS OUTSIDE YOUR HOME?: NO

## 2025-04-02 SDOH — SOCIAL STABILITY: SOCIAL INSECURITY: WITHIN THE LAST YEAR, HAVE YOU BEEN HUMILIATED OR EMOTIONALLY ABUSED IN OTHER WAYS BY YOUR PARTNER OR EX-PARTNER?: NO

## 2025-04-02 SDOH — ECONOMIC STABILITY: INCOME INSECURITY: IN THE PAST 12 MONTHS HAS THE ELECTRIC, GAS, OIL, OR WATER COMPANY THREATENED TO SHUT OFF SERVICES IN YOUR HOME?: NO

## 2025-04-02 SDOH — ECONOMIC STABILITY: FOOD INSECURITY: WITHIN THE PAST 12 MONTHS, YOU WORRIED THAT YOUR FOOD WOULD RUN OUT BEFORE YOU GOT THE MONEY TO BUY MORE.: NEVER TRUE

## 2025-04-02 SDOH — SOCIAL STABILITY: SOCIAL INSECURITY: DO YOU FEEL ANYONE HAS EXPLOITED OR TAKEN ADVANTAGE OF YOU FINANCIALLY OR OF YOUR PERSONAL PROPERTY?: NO

## 2025-04-02 SDOH — SOCIAL STABILITY: SOCIAL INSECURITY: HAVE YOU HAD THOUGHTS OF HARMING ANYONE ELSE?: NO

## 2025-04-02 SDOH — SOCIAL STABILITY: SOCIAL INSECURITY: WITHIN THE LAST YEAR, HAVE YOU BEEN AFRAID OF YOUR PARTNER OR EX-PARTNER?: NO

## 2025-04-02 SDOH — SOCIAL STABILITY: SOCIAL INSECURITY: HAS ANYONE EVER THREATENED TO HURT YOUR FAMILY OR YOUR PETS?: NO

## 2025-04-02 SDOH — SOCIAL STABILITY: SOCIAL INSECURITY: HAVE YOU HAD ANY THOUGHTS OF HARMING ANYONE ELSE?: NO

## 2025-04-02 SDOH — SOCIAL STABILITY: SOCIAL INSECURITY: ARE THERE ANY APPARENT SIGNS OF INJURIES/BEHAVIORS THAT COULD BE RELATED TO ABUSE/NEGLECT?: NO

## 2025-04-02 SDOH — SOCIAL STABILITY: SOCIAL INSECURITY: ABUSE: ADULT

## 2025-04-02 ASSESSMENT — PATIENT HEALTH QUESTIONNAIRE - PHQ9
2. FEELING DOWN, DEPRESSED OR HOPELESS: NOT AT ALL
SUM OF ALL RESPONSES TO PHQ9 QUESTIONS 1 & 2: 0
1. LITTLE INTEREST OR PLEASURE IN DOING THINGS: NOT AT ALL

## 2025-04-02 ASSESSMENT — PAIN SCALES - GENERAL
PAINLEVEL_OUTOF10: 0 - NO PAIN
PAIN_LEVEL: 0
PAINLEVEL_OUTOF10: 0 - NO PAIN

## 2025-04-02 ASSESSMENT — ACTIVITIES OF DAILY LIVING (ADL)
WALKS IN HOME: INDEPENDENT
GROOMING: INDEPENDENT
ADEQUATE_TO_COMPLETE_ADL: YES
TOILETING: INDEPENDENT
ADLS_ADDRESSED: NO
HEARING - LEFT EAR: DIFFICULTY WITH NOISE
HEARING - RIGHT EAR: HEARING AID
ASSISTIVE_DEVICE: WALKER
JUDGMENT_ADEQUATE_SAFELY_COMPLETE_DAILY_ACTIVITIES: YES
ADL_ASSISTANCE: INDEPENDENT
BATHING: INDEPENDENT
DRESSING YOURSELF: INDEPENDENT
LACK_OF_TRANSPORTATION: NO
FEEDING YOURSELF: INDEPENDENT
PATIENT'S MEMORY ADEQUATE TO SAFELY COMPLETE DAILY ACTIVITIES?: YES
LACK_OF_TRANSPORTATION: NO

## 2025-04-02 ASSESSMENT — COGNITIVE AND FUNCTIONAL STATUS - GENERAL
DRESSING REGULAR LOWER BODY CLOTHING: A LITTLE
MOBILITY SCORE: 20
PATIENT BASELINE BEDBOUND: NO
DRESSING REGULAR LOWER BODY CLOTHING: A LITTLE
WALKING IN HOSPITAL ROOM: A LITTLE
TOILETING: A LITTLE
TURNING FROM BACK TO SIDE WHILE IN FLAT BAD: A LITTLE
STANDING UP FROM CHAIR USING ARMS: A LITTLE
CLIMB 3 TO 5 STEPS WITH RAILING: A LITTLE
MOVING FROM LYING ON BACK TO SITTING ON SIDE OF FLAT BED WITH BEDRAILS: A LITTLE
HELP NEEDED FOR BATHING: A LITTLE
MOBILITY SCORE: 22
CLIMB 3 TO 5 STEPS WITH RAILING: A LITTLE
CLIMB 3 TO 5 STEPS WITH RAILING: A LITTLE
DAILY ACTIVITIY SCORE: 21
WALKING IN HOSPITAL ROOM: A LITTLE
STANDING UP FROM CHAIR USING ARMS: A LITTLE
DAILY ACTIVITIY SCORE: 23
MOVING TO AND FROM BED TO CHAIR: A LITTLE
MOVING TO AND FROM BED TO CHAIR: A LITTLE
WALKING IN HOSPITAL ROOM: A LITTLE
MOBILITY SCORE: 18

## 2025-04-02 ASSESSMENT — LIFESTYLE VARIABLES
HOW MANY STANDARD DRINKS CONTAINING ALCOHOL DO YOU HAVE ON A TYPICAL DAY: PATIENT DOES NOT DRINK
HOW OFTEN DO YOU HAVE A DRINK CONTAINING ALCOHOL: NEVER
AUDIT-C TOTAL SCORE: 0
HOW OFTEN DO YOU HAVE 6 OR MORE DRINKS ON ONE OCCASION: NEVER
SKIP TO QUESTIONS 9-10: 1
AUDIT-C TOTAL SCORE: 0

## 2025-04-02 ASSESSMENT — COLUMBIA-SUICIDE SEVERITY RATING SCALE - C-SSRS
1. IN THE PAST MONTH, HAVE YOU WISHED YOU WERE DEAD OR WISHED YOU COULD GO TO SLEEP AND NOT WAKE UP?: NO
2. HAVE YOU ACTUALLY HAD ANY THOUGHTS OF KILLING YOURSELF?: NO
2. HAVE YOU ACTUALLY HAD ANY THOUGHTS OF KILLING YOURSELF?: NO
6. HAVE YOU EVER DONE ANYTHING, STARTED TO DO ANYTHING, OR PREPARED TO DO ANYTHING TO END YOUR LIFE?: NO
1. IN THE PAST MONTH, HAVE YOU WISHED YOU WERE DEAD OR WISHED YOU COULD GO TO SLEEP AND NOT WAKE UP?: NO

## 2025-04-02 ASSESSMENT — PAIN - FUNCTIONAL ASSESSMENT
PAIN_FUNCTIONAL_ASSESSMENT: 0-10

## 2025-04-02 NOTE — NURSING NOTE
Bedside report received. Pt getting up from chair with assist x1 and walking to bathroom using walker. No needs verbalized at this time.

## 2025-04-02 NOTE — ANESTHESIA PROCEDURE NOTES
Spinal Block    Patient location during procedure: OR  Start time: 4/2/2025 10:10 AM  End time: 4/2/2025 10:16 AM  Reason for block: primary anesthetic  Staffing  Performed: KELLEE   Authorized by: Joao Laughlin DO    Performed by: FRIDA Harris    Preanesthetic Checklist  Completed: patient identified, IV checked, risks and benefits discussed, surgical consent, pre-op evaluation, timeout performed and sterile techniques followed  Block Timeout  RN/Licensed healthcare professional reads aloud to the Anesthesia provider and entire team: Patient identity, procedure with side and site, patient position, and as applicable the availability of implants/special equipment/special requirements.    Timeout performed at: 4/2/2025 10:10 AM  Spinal Block  Patient position: sitting  Prep: Betadine  Sterility prep: gloves, drape and mask  Sedation level: light sedation  Patient monitoring: blood pressure, continuous pulse oximetry and heart rate  Approach: midline  Vertebral space: L4-5  Injection technique: single-shot  Needle  Needle type: pencil-point   Needle gauge: 24 G  Needle length: 4 in  Free flowing CSF: yes    Assessment  Block outcome: patient comfortable  Procedure assessment: patient sedated but conversant throughout procedure and patient tolerated procedure well with no immediate complications

## 2025-04-02 NOTE — NURSING NOTE
Admitted to room 220 from surgery. Oriented to room.Call light given.Denies operative discomfort.Left knee dressing intact with no noted drainage.Brisk capillary refill.2+ pulses pedal.

## 2025-04-02 NOTE — ANESTHESIA POSTPROCEDURE EVALUATION
Patient: Lydia Limon    Procedure Summary       Date: 04/02/25 Room / Location: [facility] 02 / [facility]    Anesthesia Start: 1006 Anesthesia Stop: 1205    Procedure: Knee Replacement Total Cement Unilat (Left: Knee) Diagnosis:       Unilateral primary osteoarthritis, left knee      (Unilateral primary osteoarthritis, left knee [M17.12])    Surgeons: Trell Kaur MD Responsible Provider: Joao Laughlin DO    Anesthesia Type: MAC, spinal ASA Status: 2            Anesthesia Type: MAC, spinal    Vitals Value Taken Time   /68 04/02/25 1156   Temp 36.2 °C (97.2 °F) 04/02/25 1156   Pulse 75 04/02/25 1156   Resp 14 04/02/25 1156   SpO2 97 % 04/02/25 1156       Anesthesia Post Evaluation    Patient location during evaluation: PACU  Patient participation: complete - patient participated  Level of consciousness: awake  Pain score: 0  Pain management: adequate  Multimodal analgesia pain management approach  Airway patency: patent  Two or more strategies used to mitigate risk of obstructive sleep apnea  Cardiovascular status: acceptable  Respiratory status: face mask  Hydration status: acceptable  Postoperative Nausea and Vomiting: none        There were no known notable events for this encounter.

## 2025-04-02 NOTE — ANESTHESIA PROCEDURE NOTES
Peripheral Block    Patient location during procedure: pre-op  Start time: 4/2/2025 9:22 AM  End time: 4/2/2025 9:27 AM  Reason for block: at surgeon's request and post-op pain management  Staffing  Performed: attending and RASHI   Authorized by: Joao Laughlin DO    Performed by: Tim Dunn MD  Preanesthetic Checklist  Completed: patient identified, IV checked, site marked, risks and benefits discussed, surgical consent, monitors and equipment checked, pre-op evaluation and timeout performed   Timeout performed at: 4/2/2025 9:17 AM  Peripheral Block  Patient position: laying flat  Prep: ChloraPrep  Patient monitoring: heart rate, cardiac monitor and continuous pulse ox  Block type: adductor canal  Laterality: left  Injection technique: single-shot  Guidance: ultrasound guided  Needle  Needle gauge: 21 G  Needle length: 10 cm  Needle localization: ultrasound guidance  Test dose: negative  Assessment  Injection assessment: negative aspiration for heme, no paresthesia on injection, incremental injection and local visualized surrounding nerve on ultrasound  Paresthesia pain: none  Heart rate change: no  Slow fractionated injection: yes  Additional Notes  Ropivicaine 20 ml 0.5 % with 5 mg Decadron

## 2025-04-02 NOTE — ANESTHESIA PREPROCEDURE EVALUATION
Patient: Lydia Crystalolillo    Procedure Information       Date/Time: 04/02/25 0935    Procedure: Knee Replacement Total Cement Unilat *Overnight* (Left: Knee) - DePuy Attune Knee, [place]    Location: [facility] / [facility]    Surgeons: Trell Kaur MD            Relevant Problems   Anesthesia (within normal limits)      Cardiac   (+) Hyperlipidemia   (+) PVC (premature ventricular contraction)      Neuro   (+) Lumbar radiculitis      Musculoskeletal   (+) Primary osteoarthritis of left knee   (+) Unilateral primary osteoarthritis, left knee      HEENT   (+) Hearing loss       Clinical information reviewed:   Tobacco  Allergies  Meds   Med Hx  Surg Hx  OB Status  Fam Hx  Soc   Hx        NPO Detail:  NPO/Void Status  Date of Last Liquid: 04/01/25  Time of Last Liquid: 1900  Date of Last Solid: 04/01/25  Time of Last Solid: 1900  Last Intake Type: Clear fluids  Time of Last Void: 0810         Physical Exam    Airway  Mallampati: II  TM distance: >3 FB  Neck ROM: full     Cardiovascular - normal exam     Dental - normal exam     Pulmonary - normal exam  Breath sounds clear to auscultation     Abdominal - normal exam             Anesthesia Plan    History of general anesthesia?: yes  History of complications of general anesthesia?: no    ASA 2     MAC and spinal     intravenous induction   Anesthetic plan and risks discussed with patient.    Plan discussed with CAA.

## 2025-04-02 NOTE — PROGRESS NOTES
79 yr old female admitted extended recovery following left knee replacement with Dr. Kaur.  Plan is home tomorrow with [facility] PT. SOC pending confirmation on 4/4/25.  Post op follow up on Thursday May 15, 2025 1:00 PM-Ita.  Spouse to transport and assist with care as needed.      04/02/25 5832   Discharge Planning   Living Arrangements Spouse/significant other   Support Systems Spouse/significant other   Assistance Needed transportation   Type of Residence Private residence   Number of Stairs to Enter Residence 4   Number of Stairs Within Residence 12  (to 2nd level, and 12 downt to BM)   Do you have animals or pets at home? Yes   Type of Animals or Pets 2 cats   Who is requesting discharge planning? Provider   Home or Post Acute Services In home services   Type of Home Care Services Home PT   Expected Discharge Disposition Home H  ( Home Care)   Does the patient need discharge transport arranged? No   Financial Resource Strain   How hard is it for you to pay for the very basics like food, housing, medical care, and heating? Not hard   Housing Stability   In the last 12 months, was there a time when you were not able to pay the mortgage or rent on time? N   In the past 12 months, how many times have you moved where you were living? 0   At any time in the past 12 months, were you homeless or living in a shelter (including now)? N   Transportation Needs   In the past 12 months, has lack of transportation kept you from medical appointments or from getting medications? no   In the past 12 months, has lack of transportation kept you from meetings, work, or from getting things needed for daily living? No   Patient Choice   Provider Choice list and CMS website (https://medicare.gov/care-compare#search) for post-acute Quality and Resource Measure Data were provided and reviewed with: Patient   Patient / Family choosing to utilize agency / facility established prior to hospitalization No   Stroke Family Assessment    Stroke Family Assessment Needed No

## 2025-04-02 NOTE — PROGRESS NOTES
Physical Therapy Evaluation & Treatment    Patient Name: Lydia Limon  MRN: [id number]  Department[facility]  [facility]  Today's Date: 4/2/2025   Time Calculation  Start Time: 1650  Stop Time: 1736  Time Calculation (min): 46 min    Assessment/Plan   PT Assessment  PT Assessment Results: Decreased strength, Decreased range of motion, Decreased mobility, Decreased coordination, Orthopedic restrictions  Rehab Prognosis: Excellent  Evaluation/Treatment Tolerance: Patient tolerated treatment well  Strengths: Ability to acquire knowledge, Access to adaptive/assistive products, Attitude of self, Capable of completing ADLs semi/independent, Insight into problems, Support of Caregivers, Support and attitude of living partners, Support of pets  Barriers to Participation: Comorbidities  End of Session Communication: Bedside nurse  Assessment Comment: Pt low complexity eval presenting with impaired LE coordination and deficits to functional mobility following L TKA performed on 4/2/2025. Education regarding TKA precautions provided with handout issued; pt verbalized understanding. Therapeutic exercises performed; HEP handout provided. Pt able to participate in bed mobility, functional transfers, and ambulation using walker and hands-on assist from PT. Pt is very motivated to participate in therapy and has support of spouse at home. PT will reassess functional status next session.    End of Session Patient Position: Up in chair, Alarm on with LLE elevated and extended with ice to surgical site. Call light left in reach; patient instructed not to get up on own and verbalized understanding of this. RN aware.    IP OR SWING BED PT PLAN  Inpatient or Swing Bed: Inpatient  PT Plan  Treatment/Interventions: Bed mobility, Transfer training, Gait training, Stair training, Strengthening, Range of motion, Therapeutic exercise, Therapeutic activity, Home exercise program, Positioning  PT Plan: Ongoing PT  PT Frequency: BID  PT  Discharge Recommendations: Low intensity level of continued care  Equipment Recommended upon Discharge: Wheeled walker  PT Recommended Transfer Status: Assist x1, Assistive device      Subjective     General Visit Information:  General  Reason for Referral: L TKA (4/2/2025)  Referred By: Dr. Kaur  Past Medical History Relevant to Rehab: OA, L ear complete HL, R hearing aid, HLD, L ear sx, parathyroidectomy, lumbar radiculopathy, osteoporosis, PVC, B PVD  Family/Caregiver Present: No  Prior to Session Communication: Bedside nurse  Patient Position Received: Bed, 3 rail up, Alarm on  Preferred Learning Style: verbal, visual, written  General Comment: Session cleared by RN. Pt pleasant and agreeable to PT evaluation. Dressing to L knee dry & intact. Pt deaf in L ear; able to hear fair on R side and read lips.    Home Living:  Home Living  Type of Home: House  Lives With: Spouse  Home Adaptive Equipment: Walker rolling or standard  Home Layout: Two level, Stairs to alternate level with rails, Able to live on main level with bedroom/bathroom  Alternate Level Stairs-Rails: Left  Alternate Level Stairs-Number of Steps: 13  Home Access: Stairs to enter with rails  Entrance Stairs-Rails: Left  Entrance Stairs-Number of Steps: 4  Bathroom Shower/Tub: Tub/shower unit  Prior Level of Function:  Prior Function Per Pt/Caregiver Report  Level of [place]: Independent with ADLs and functional transfers, Independent with homemaking with ambulation  ADL Assistance: Independent  Homemaking Assistance: Independent  Ambulatory Assistance: Independent  Vocational: Retired  Prior Function Comments: Pt denies falls prior to admission.  Precautions:  Precautions  LE Weight Bearing Status: Weight Bearing as Tolerated  Medical Precautions: Fall precautions  Post-Surgical Precautions: Left total knee precautions    Objective   Pain:  Pain Assessment  Pain Assessment: 0-10  0-10 (Numeric) Pain Score: 0 - No pain  Pain Interventions:  Cold applied, Ambulation/increased activity, Elevated  Response to Interventions: Content/relaxed  Cognition:  Cognition  Overall Cognitive Status: Within Functional Limits  Attention: Within Functional Limits  Memory: Within Funtional Limits  Problem Solving: Within Functional Limits  Numeric Reasoning: Within Functional Limits  Abstract Reasoning: Within Functional Limits  Safety/Judgement: Within Functional Limits  Insight: Within function limits  Impulsive: Within functional limits  Processing Speed: Within funtional limits    General Assessments:  Activity Tolerance  Endurance: Endurance does not limit participation in activity    Sensation  Light Touch: No apparent deficits  Proprioception: No apparent deficits    Coordination  Movements are Fluid and Coordinated: No  Lower Body Coordination: impaired from post-op limitations    Postural Control  Postural Control: Within Functional Limits    Static Sitting Balance  Static Sitting-Balance Support: Feet supported  Static Sitting-Level of Assistance: Independent  Dynamic Sitting Balance  Dynamic Sitting-Balance Support: Feet supported  Dynamic Sitting-Level of Assistance: Independent    Static Standing Balance  Static Standing-Balance Support: Bilateral upper extremity supported (with rolling walker)  Static Standing-Level of Assistance: Close supervision  Dynamic Standing Balance  Dynamic Standing-Balance Support: Bilateral upper extremity supported (with rolling walker)  Dynamic Standing-Level of Assistance: Contact guard, Minimum assistance  Functional Assessments:  ADL  ADL's Addressed: No    Bed Mobility  Bed Mobility: Yes  Bed Mobility 1  Bed Mobility 1: Supine to sitting, Scooting  Level of Assistance 1: Independent    Transfers  Transfer: Yes  Transfer 1  Transfer From 1: Bed to, Stand to  Transfer to 1: Stand, Chair with arms  Technique 1: Sit to stand, Stand to sit  Transfer Device 1: Walker, Gait belt  Transfer Level of Assistance 1: Close supervision,  Minimal verbal cues (cues for sequencing, hand placement)  Trials/Comments 1: x2    Ambulation/Gait Training  Ambulation/Gait Training Performed: Yes  Ambulation/Gait Training 1  Surface 1: Level tile  Device 1: Rolling walker  Gait Support Devices: Gait belt  Assistance 1: Contact guard, Minimum assistance, Minimal verbal cues (cues for sequencing)  Quality of Gait 1: Decreased step length, Antalgic (no knee buckle however pt did have minor lateral LOB that PT provided Peter to steady. decreased kathleen, reciprocal stepping)  Comments/Distance (ft) 1: 50 feet x2    Stairs  Stairs: No  Extremity/Trunk Assessments:  RUE   RUE : Within Functional Limits  LUE   LUE: Within Functional Limits  RLE   RLE : Within Functional Limits  LLE   LLE : Exceptions to WFL  AROM LLE (degrees)  LLE AROM Comment: L knee AROM 0-90 degrees  Strength LLE  LLE Overall Strength: Greater than or equal to 3/5 as evidenced by functional mobility  Treatments:  Therapeutic Exercise  Therapeutic Exercise Performed: Yes B ankle pumps, L quad sets, L gluteal sets, L heel slides, L SAQ, L hip abduction, and L SLR x 10 reps each.    Outcome Measures:  [facility] Basic Mobility  Turning from your back to your side while in a flat bed without using bedrails: None  Moving from lying on your back to sitting on the side of a flat bed without using bedrails: None  Moving to and from bed to chair (including a wheelchair): None  Standing up from a chair using your arms (e.g. wheelchair or bedside chair): None  To walk in hospital room: A little  Climbing 3-5 steps with railing: A little  Basic Mobility - Total Score: 22    Encounter Problems       Encounter Problems (Active)       Balance       LTG - Patient will maintain standing and sitting balance to allow for completion of daily activities (Progressing)       Start:  04/02/25               Compromised Skin Integrity       LTG - Patient will be free from infection (Progressing)       Start:  04/02/25                Mobility       LTG - Patient will ambulate community distance using rolling walker with distant supervision.  (Progressing)       Start:  04/02/25            LTG - Patient will navigate 4 steps using rails/device with close supervision (Progressing)       Start:  04/02/25               PT Transfers       LTG - Patient will demonstrate safe transfer techniques using rolling walker with distant supervision.  (Progressing)       Start:  04/02/25               Pain          Pain - Adult          Safety       LTG - Patient will demonstrate safety requirements appropriate to situation/environment (Progressing)       Start:  04/02/25                   Education Documentation  Handouts, taught by Tere Toney PT at 4/2/2025  5:52 PM.  Learner: Patient  Readiness: Eager  Method: Explanation, Demonstration, Handout  Response: Verbalizes Understanding, Demonstrated Understanding    Precautions, taught by Tere Toney PT at 4/2/2025  5:52 PM.  Learner: Patient  Readiness: Eager  Method: Explanation, Demonstration, Handout  Response: Verbalizes Understanding, Demonstrated Understanding    Body Mechanics, taught by Tere Toney PT at 4/2/2025  5:52 PM.  Learner: Patient  Readiness: Eager  Method: Explanation, Demonstration, Handout  Response: Verbalizes Understanding, Demonstrated Understanding    Home Exercise Program, taught by Tere Toney PT at 4/2/2025  5:52 PM.  Learner: Patient  Readiness: Eager  Method: Explanation, Demonstration, Handout  Response: Verbalizes Understanding, Demonstrated Understanding    Mobility Training, taught by Tere Toney PT at 4/2/2025  5:52 PM.  Learner: Patient  Readiness: Eager  Method: Explanation, Demonstration, Handout  Response: Verbalizes Understanding, Demonstrated Understanding    Education Comments  No comments found.          Tere Toney PT, DPT

## 2025-04-02 NOTE — PERIOPERATIVE NURSING NOTE
Pt has done well in recovery. Pt now with movement in bilateral lower ext. Sensation mid thighs. No c/o pain. Pt tolerates ginger ale well. No c/o ponv. Vss. Report called to floor rn. Pt ready for floor transfer.

## 2025-04-02 NOTE — DISCHARGE INSTRUCTIONS
MD Irene Pozo MPAS, ROWENA, ATC  Adult Reconstruction and Joint Replacement Surgery  Phone: [phone]     Fax: [phone]        DISCHARGE INSTRUCTIONS      PLEASE READ CAREFULLY BEFORE CONTACTING YOUR PROVIDER.    WE WORK COLLABORATIVELY AS A TEAM. CALLING MULTIPLE STAFF MEMBERS REGARDING THE SAME ISSUE WILL DELAY YOUR CARE.    [employer] IS THE PREFERRED COMMUNICATION FOR ALL TEAM MEMBERS.    POSTOPERATIVE INSTRUCTIONS: TOTAL HIP & TOTAL KNEE ARTHROPLASTY    JOINT CARE TEAM  Please use the information below to contact your care team following surgery.  If you are leaving a message or using the [employer] Chart portal, please include your full name, date of birth and date of surgery so that we can correctly identify you.  Your call will be returned within 1-2 business days, please do not leave multiple messages with other staff regarding a single issue while you are awaiting a return call.     Who to call Contact Information Matters needing handled   Irene Goncalves PA-C  Physician Assistant [employer] [facility]   Medical questions/concerns       Cornell Robertson-    [email or web address]           [phone]  opt. 2    [phone] fax  [phone]         Scheduling office Visits  Medical questions/concerns  Leave of Absence or other paperwork  Any concerns more than 6 weeks from surgery - an appointment will need to be made    After Hour and Weekend Emergency Answering Service [phone]     Trinh Stevens MBA, BSN, RN-BC, ONC  VERONA Sanchez, RN  Ortho Nurse Navigators [place]        __________________________________    Ulices Cameron RN, BSN  Ortho Coordinator [place] LINDAN-BC  Ortho Nurse Navigator [facility]       [phone]     _____________________    [phone] Please call staff at the institution in which you had surgery for prescription refills        Prescription Refills  Nursing, medical question related  questions or concerns within 6 weeks of surgery   Orders for Outpatient Physical Therapy             MEDICATION REFILLS - MyChart or Nurse Navigator (Above) at the institution in which you had surgery. [facility] or [facility].    -You will NOT receive a call indicating that your prescription has been filled.  Please contact your pharmacy with any questions.    Medication refills will be filled Monday-Friday 7am to 1pm ONLY. Please call the nurse navigator office or send a [employer] message for a refill request.  Any requests received outside of this timeframe will be handled on the next business day.  Please do not call multiple times or call other members of the care team for medication needs, this will cause the refill to take longer.    Per State and Institutional policy, pain medications can only be refilled every 7 days for up to six weeks following surgery.    My Chart Portal: If you are using the My Chart portal and are requesting a medication refill, please list what type of surgery you had and left or right side, medication that needs refilled, and pharmacy you would like your medication sent.     WEIGHT BEARING- weight bearing as tolerated to operative extremity     ACTIVITY-As Tolerated    DRIVING & TRAVEL AFTER SURGERY   Patients should anticipate waiting at least 4-6 weeks before traveling long distances after surgery.  You will need to stop to walk around ever 1 hour during your travel to help with blood clot prevention.    Patients may not drive until cleared by the joint nurse or the office and you are off of all narcotics.    DENTAL PROCEDURES & CLEANINGS  You must wait a minimum of 3 months for elective dental appointments after a total joint replacement, including routine cleanings or dental work including bridges, crowns, extractions, etc.. Unless, it is an emergency. You will need a prophylactic antibiotic lifelong prior to any dental visit, cleaning or procedure. Your surgeon's office or your  dentist may provide a prescription antibiotic. Antibiotics are a lifelong need before dental appointments.      You do not need antibiotics for endoscopic procedures such as colonoscopy or EGD, dermatologic biopsies or eye surgeries.    WOUND CARE  If you experience continued drainage or bleeding, you may cover with abdominal/Maxi pads (purchase at local drug store).  Knee replacements should wrap with an ace wrap.  You may shower with waterproof dressing on. Your surgical bandage will be removed by your home therapist 1 week after surgery. If you have staples intact, home care will remove in 2 weeks. If you have sutures intact, you will need to return to the office in 2 weeks for suture removal. Once the dressing is removed by home care, you may continue to shower. Let soap and water wash over the wound. DO NOT SCRUB.  Steri-strips under the bandage will remain in place until they fall off on their own.  If they are loose, you may gently remove.  If they have not fallen off in two weeks, gently peel them off. Do not remove if pulling causes resistance against the incision.  You will see suture tails sticking out of the ends of the incision.  DO NOT CUT THEM.  They will fall off when the sutures dissolve.  If they are bothersome, cover with a band aid.  Do not soak in a bath tub, hot tub, pool or lake until you are 8 weeks out from surgery.  Do not apply lotions, creams or ointments until you are 6 weeks out from surgery,    PAIN, SWELLING, BRUISING & CLICKING  Pain and swelling are a natural part of your recovery which is considered normal for up to a year after surgery.  Symptoms may be treated with movement, ice, compression stockings, elevating your leg, and by following the pain medication regimen as prescribed.  Bruising is normal for several weeks after surgery. You may also have leg swelling and pain in your shin.  You may ice areas that are tender to help with discomfort.  You are required to wear the  provided compression stockings, every day, for 4 weeks following surgery.  Remove the stockings at night and place them back on in the morning.  Pain and swelling may temporarily increase with an increase in activity or exercise.  Use ice after activity.  Audible clicking with movement or exercises is considered normal following joint replacement.  If this persists at 6 months or 1 year, please notify your surgeon.  You may also feel decreased sensation or numbness near the incision site.  This is normal and sensation may or may not return.    PERSONAL HYGIENE  You may shower upon discharging from the hospital.  Soap and water is permitted to run over the surgical dressing, steri-strips and incision.  Do not scrub directly over these items.  DO NOT soak your incision in a bath, hot tub, pool or pond/lake for a minimum of 8 weeks following your surgery.  DO NOT use lotions, creams, ointments on your wound for a minimum of 6 weeks following your surgery. At that time you may use vitamin E to assist with softening of your incision.      RESTARTING HOME ROUTINE - DIET & MEDICATIONS  Post-operative constipation can result due to a combination of inactivity, anesthesia and pain medication. To help prevent this, you should increase your water and fiber intake. Physical activity such as walking will also help stimulate the bowels.   You may resume your normal diet when you discharge home.    To avoid constipation, choose foods that help promote good bowel habits, such as foods high in fiber.  You may restart your home medications the following day after your surgery UNLESS you have been given alternate instructions.  Follow the instructions given to you on your hospital discharge instructions for more information regarding your home medications.  IF YOU EXPERIENCE NAUSEA OR DIARRHEA, FOLLOW THE B.R.A.T. DIET UNTIL SYMPTOMS RESOLVE.  If you are experiencing vomiting that lasts more than 24 hours, please contact your joint  nurse.      IN-HOME PHYSICAL THERAPY & OUTPATIENT PHYSICAL THERAPY  In-home physical therapy will start 1-2 days after you get home from the hospital.    The home care agency will call within the first 24-48 hours to set up their first visit.  Please do not call your care team to inquire during this timeframe.  Continue the exercises you were given in the hospital until you have been seen by in-home therapy.  Make sure to provide a phone number with the ability for the home care staff to leave a message if you do not answer your phone.    Outpatient physical therapy following knee replacement surgery should begin 2-3 weeks after surgery.  You will be given physical therapy order prior to discharge from the hospital. You should call to schedule this appointment ASAP if not already scheduled before surgery.  Waiting until you are ready for outpatient physical therapy will cause a delay in your care.  You may choose any outpatient physical therapy location.      EMERGENCIES - WHEN TO CONTACT THE SURGEON'S OFFICE IMMEDIATELY  Fever >101 with chills that has been present for at least 48 hours.   Excessive bleeding from incision that will not slow down. A small amount of drainage is normal and expected.  Once pressure is applied and the area is covered, do not continue to check the area regularly.  This will remove pressure and bleeding will continue.  Leave in place for 4-6 hours.  Signs of infection of incision-excessive drainage that is soaking through your dressing (especially if it is pus-like), redness that is spreading out from the edges of your incision, or increased warmth around the area.  Excruciating pain for which the pain medication, taken as instructed, is not helping.  Severe calf pain.  Go directly to the emergency room or call 911, if you are experiencing chest pain or difficulty breathing.    After Hour and Weekend Emergency Answering Service [phone]    ICE & COLD THERAPY INSTRUCTIONS    To assist  with pain control and post-op swelling, you should be using ice regularly throughout recovery, especially for the first 6 weeks, regardless of the cold therapy method you use.      Always make sure there is a layer of protection between the cold pad and your skin.    If you are using ICE PACKS or GEL PACKS, you will need to alternate 20 minutes on, 20 minutes off twice per hour.    If you are using an ICE MACHINE, please follow the provided ice machine instructions.  These devices differ from ice or ice packs whereas the mechanism circulates water through tubing and a pad to provide longer periods of cold therapy to the desired site.  You can use your cold devices around the clock for optimal comfort.  We recommend using cold therapy after working with therapy or completing exercises on your own.  There is no set schedule in which you must follow while using cold therapy.  Below are a few points to remember when using a cold therapy device:    You do not need to need to use the 20 on, 20 off method.  Detach the pad from the cooler and ambulate at least once every hour.  You can check your skin under the pad at this time.  You may wear the cold therapy device during periods of sleep including overnight.  If you wake up during the night, you can check the skin at this time.  You do not need to wake up specifically to perform skin checks.  Empty the cooler and pad when device is not in use.  Follow 's instructions for cleaning your cold therapy device.    DISCHARGE MEDICATIONS - Please reference the sample schedule on the reverse side for instructions on how to best schedule medications.    PAIN MEDICATION    ___X_ Tramadol / Oxycodone  Tramadol and Oxycodone have been prescribed for post-operative pain control.    These medications will only be refilled ONCE every 7 days for a period of up to 6 weeks following surgery.  After 6 weeks, you will transition to acetaminophen and over -the- counter  anti-inflammatories such as Ibuprofen, Advil or Aleve in conjunction with ICE/COLD THERAPY.   Side effects may be constipation and nausea, vomiting, sleepiness, dizziness, lightheadedness, headache, blurred vision, dry mouth sweating, itching (if you have itching, over-the -counter Benadryl can be used as needed).  You may NOT operate a motor vehicle while taking these medications or have been cleared by your care team.     ___X_ Acetaminophen (Tylenol)  Acetaminophen has been prescribed as an adjunct for pain control. Take two 500 mg tablets every 6 hours for 4 weeks. You will not receive a refill on this medication.  Do not exceed 4000mg of acetaminophen within a 24 hour period.  Side effects may include nausea, heartburn, drowsiness, and headache.    ___X_ Meloxicam (Mobic)-Meloxicam has been prescribed as an adjunct anti-inflammatory to assist in pain control.    Take one 15mg tablet once daily for 4 weeks.  You will not receive refills on this medication.   Side effects may include nausea.  May not be prescribed if you are on a more potent blood thinner than aspirin or have chronic kidney disease.    BLOOD THINNER    ___X_ Blood Thinner   A blood thinner has been prescribed to prevent blood clots in your leg or lungs. Take as prescribed on the bottle for 4 weeks. You will not receive a refill on this medication.    ANTI NAUSEA    ___X_ Proton Pump Inhibitor (PPI)-Stomach Acid Reduction Medication  If you are already on a PPI, you will continue your regular medication. If you are not, you will be prescribed Pantoprazole to help with nausea and protect your stomach while taking pain medication.  You will not receive a refill on this medication.    STOOL SOFTENERS    ___X_ Colace (Docusate Sodium) & Miralax (polyethylene glycol)  Take both medications to help with constipation while using the Oxycodone and Tramadol for pain control.  You will not receive a refill on this medication.    Continued Constipation  If  you continue to be constipated despite daily use of Miralax and Colace, you try an over-the-counter Dulcolax Suppository and use per instructions on the package.       SPECIAL INSTRUCTIONS   ***    You will not receive refills on the following medications.   Acetaminophen (Tylenol  Meloxicam  Miralax  Colace  Proton Pump Inhibitor (PPI)  Blood Thinner    Pain Medication Refills - Ortho Nurse Navigator or MyChart- Monday through Friday 7am-1pm    FOLLOW-UP- You should have an appointment with either Dr. Kaur or DG Toure in 6 weeks.         SAMPLE              The times below are an example of how to organize medications to optimize pain control  Your actual medication schedule may vary based on your last dose taken IN THE HOSPITAL      Time 3:00 am 6:00 am 9:00 am 12:00 pm 3:00 pm 6:00 pm 9:00 pm 12:00 am   Medications Tramadol Tylenol  Oxycodone  Miralax   Blood Thinner  Colace  Pantoprazole or other PPI  Tramadol  Meloxicam Tylenol  Oxycodone Tramadol Tylenol  Oxycodone  Miralax Blood Thinner  Colace  Tramadol   Tylenol  Oxycodone            You may begin to wean off the pain medication as your pain remains controlled with increased activity.  The schedules provided are meant to serve as an example.  You may wean off based on your pain control.  Please note that pain medications are not filled beyond 6 weeks after surgery.              The times below are an example of how to WEAN OFF medications WHILE CONTINUING TO OPTIMIZE PAIN CONTROL.  Your actual medication schedule may vary based on your last dose taken.    Time 12:00am 4:00am 8:00am 12:00pm 4:00pm 8:00pm   Med Tramadol Oxycodone   Tramadol Oxycodone Tramadol Oxycodone     Time 12:00am 6:00am 12:00pm 6:00pm   Med Tramadol Oxycodone   Tramadol Oxycodone     Time 12:00am 8:00am 4:00pm   Med Tramadol Oxycodone   Tramadol     Time 12:00am 12:00pm   Med Tramadol Tramadol         TOTAL KNEE REPLACEMENT PATIENTS SHOULD TRANSITION TO OUTPATIENT PHYSICAL  THERAPY NO MORE THAN 3 WEEKS FOLLOWING SURGERY.  PLEASE SEE THE LIST OF  FACILITIES BELOW.  CALL TO SCHEDULE YOUR FIRST APPOINTMENT BEFORE YOU HAVE YOUR SURGERY.

## 2025-04-02 NOTE — DISCHARGE SUMMARY
MD Irene Pozo, MPAS, PADelvisC, ATC  Adult Reconstruction and Joint Replacement Surgery  Phone: [phone]     Fax:[phone]             Discharge Summary    Discharge Diagnosis  Left Total Knee Arthroplasty     Issues Requiring Follow-Up  Home care services to start within 48 hours. Outpatient PT to start 2 weeks  S/P total Joint for Knees only. Hips optional.    Test Results Pending At Discharge  Pending Labs       No current pending labs.          Hospital Course  Patient underwent Left Total Knee Arthroplasty  on 4/2/25 without complications. The patient was then taken to the PACU in stable condition. Patient was then transferred to the or.  Pain was appropriately controlled. Diet was advanced as tolerated. Patient progressed adequately through their recovery during hospital stay including PT/ OT and were recommended for discharge. Patient was then discharged on  to home in stable condition. Patient had uneventful hospital course. Patient was instructed on the use of pain medications as needed for pain. The signs and symptoms of infection were discussed and the patient was given our number to call should they have any questions or concerns following discharge.    Based on my clinical judgment, the patient was provided with a 7-day prescription for opioid medication at 30 MED, indicated for treatment of acute pain in the setting of recent Total Joint Arthroplasty. OARRS report was run and has demonstrated an appropriate time course.  The patient has been provided with counseling pertaining to safe use of opioid medication.    Patient may use operative extremity WBAT with use of walker for assistance with ambulation .  Mepilex dressing to be removed POD # 7 by home care and incision left open to air  OAC for DVT prophylaxis started on POD #1 and to be taken for 30 days    Patient is to follow-up in 6 weeks at scheduled post-op visit.     Face-to Face after surgery progress  note  Pertinent Physical Exam At Time of Discharge  Review of Systems   Constitutional: Negative.  Negative for activity change, chills, fatigue and fever.   HENT: Negative.     Eyes: Negative.    Respiratory: Negative.  Negative for cough, chest tightness, shortness of breath and wheezing.    Cardiovascular: Negative.  Negative for palpitations.   Gastrointestinal:  Negative for abdominal pain, blood in stool, nausea and vomiting.   Endocrine: Negative.  Negative for cold intolerance and polyuria.   Genitourinary: Negative.  Negative for difficulty urinating, dysuria, frequency, hematuria and urgency.   Musculoskeletal:  Positive for gait problem and joint swelling. Negative for arthralgias and back pain.   Skin: Negative.  Negative for color change, pallor, rash and wound.   Allergic/Immunologic: Negative.  Negative for environmental allergies.   Neurological:  Negative for dizziness, weakness and light-headedness.   Hematological: Negative.    Psychiatric/Behavioral:  Negative for agitation, confusion and suicidal ideas. The patient is not nervous/anxious.    All other systems reviewed and are negative    Physical Exam  side: left knee  Vitals and nursing note reviewed. VSS, Afebrile  Constitutional:       Appearance: Normal appearance, awake and alert.  HENT:      Head: Normocephalic and atraumatic.       Pupils: Pupils are equal, round, and reactive to light.   Cardiovascular:      Rate and Rhythm: Normal rate and regular rhythm.   Pulmonary:      Effort: Pulmonary effort is normal.     Abdominal:         Palpations: Abdomen is soft.   Musculoskeletal:   Sensation intact bilaterally, sural/saph/sp/tibal n.  Motor intact flexion/extension/DF/PF/EHL/FHL bilaterally. Palpable symmetric DP/PT pulse bilaterally. Spinal wearing off.    Skin:      Bulky Dressing intact to the surgical extremity. No signs of gross bloody or purulent drainage.     General: Skin is warm and dry.      Capillary Refill: Capillary refill  takes less than 2 seconds.   Neurological:      General: No focal deficit present.      Mental Status: She is alert and oriented to person, place, and time. Mental status is at baseline.   Psychiatric:         Mood and Affect: Mood normal.        Home Medications  Scheduled medications  No current facility-administered medications for this encounter.    Current Outpatient Medications:     acetaminophen (Tylenol Extra Strength) 500 mg tablet, Take 2 tablets (1,000 mg) by mouth every 6 hours if needed for mild pain (1 - 3)., Disp: 240 tablet, Rfl: 0    ascorbic acid, vitamin C, (Sherrie-C) crystals, Take by mouth., Disp: , Rfl:     aspirin 81 mg EC tablet, Take by mouth., Disp: , Rfl:     aspirin 81 mg EC tablet, Take 1 tablet (81 mg) by mouth 2 times a day., Disp: 60 tablet, Rfl: 0    atorvastatin (Lipitor) 10 mg tablet, TAKE 1 TABLET BY MOUTH EVERY DAY, Disp: 90 tablet, Rfl: 3    calcium carbonate-vitamin D3 (Caltrate 600 plus D) 600 mg-20 mcg (800 unit) tablet,chewable, Chew 2 tablets once daily., Disp: , Rfl:     chlorhexidine (Peridex) 0.12 % solution, Swish and spit 15 mL night before surgery and morning of surgery, Disp: 473 mL, Rfl: 0    docusate sodium (Colace) 100 mg capsule, Take 1 capsule (100 mg) by mouth 2 times a day., Disp: 60 capsule, Rfl: 0    ibuprofen (AdviL) 200 mg tablet, every 6 hours., Disp: , Rfl:     meloxicam (Mobic) 15 mg tablet, Take 1 tablet (15 mg) by mouth once daily., Disp: 30 tablet, Rfl: 0    multivitamin tablet, Take by mouth., Disp: , Rfl:     oxyCODONE (Roxicodone) 5 mg immediate release tablet, Take 1 tablet (5 mg) by mouth every 6 hours if needed for severe pain (7 - 10) for up to 7 days., Disp: 28 tablet, Rfl: 0    pantoprazole (ProtoNix) 40 mg EC tablet, Take 1 tablet (40 mg) by mouth once daily. Do not crush, chew, or split., Disp: 30 tablet, Rfl: 0    polyethylene glycol (Glycolax, Miralax) 17 gram packet, Take 17 g by mouth once daily. Mix 1 cap (17g) into 8 ounces of fluid.,  Disp: 30 packet, Rfl: 0    traMADol (Ultram) 50 mg tablet, Take 1 tablet (50 mg) by mouth every 6 hours if needed for severe pain (7 - 10) for up to 7 days., Disp: 28 tablet, Rfl: 0    vitamin E 180 mg (400 unit) capsule, Take 1 capsule (400 Units) by mouth once daily., Disp: , Rfl:      PRN medications      Discharge medications     Your medication list         Notice    Cannot display discharge medications because the patient has not yet been admitted.         You have not been prescribed any medications.     Outpatient Follow-Up  Patient to follow-up with /Irene Goncalves PA-C.  Thank you for trusting us with your care. You should be scheduled for a follow-up post-surgical visit in 6 weeks.    Special Instructions      Please read discharge instructions provided by your surgeon before calling with questions as this will delay care.    Medication refills-Oxycodone and Tramadol will be refilled every 7 days per state law. Request refills through Joint Navigator at the institution in which you had surgery or MyChart. All medication requests may take up to 72 hours to refill and refills after Friday 1pm will be refilled on the next business day.      No future appointments.    GALILEO Goyal, PA-C ATC  Orthopedic Physician Assisant  Adult Reconstruction and Total Joint Replacement  General Orthopedics  Department of Orthopaedic Surgery  [place] [address]  [employer] messaging preferred

## 2025-04-02 NOTE — HH CARE COORDINATION
Home Care received a Referral for Physical Therapy. We have processed the referral for a Start of Care on 04/04.     If you have any questions or concerns, please feel free to contact us at [phone]. Follow the prompts, enter your five digit zip code, and you will be directed to your care team on [facility] 1.

## 2025-04-02 NOTE — OP NOTE
Knee Replacement Total Cement Unilat (L) Operative Note     Date: 2025  OR Location: [facility]    Name: Lydia Limon, : 1945, Age: 79 y.o., MRN: [id number], Sex: female    Diagnosis  Pre-op Diagnosis      * Unilateral primary osteoarthritis, left knee [M17.12] Post-op Diagnosis     * Unilateral primary osteoarthritis, left knee [M17.12]     Procedures  Knee Replacement Total Cement Unilat  41181 - [facility] ARTHRP KNE CONDYLE&PLATU MEDIAL&LAT COMPARTMENTS      Surgeons      * Trell Kaur - Primary    Resident/Fellow/Other Assistant:  Surgeons and Role:  * No surgeons found with a matching role *    Staff:   Circulator: Magda  Scrub Person: Maida Cosmeub Person: Ana    Anesthesia Staff: Anesthesiologist: DO NIRMAL Lui-AA: FRIDA Harris  RASHI: Syed Lundy    Procedure Summary  Anesthesia: Anesthesia type not filed in the log.  ASA: II  Estimated Blood Loss: 25mL  Intra-op Medications:   Administrations occurring from 0935 to 1215 on 25:   Medication Name Total Dose   dexAMETHasone (Decadron) 4 mg/mL 4 mg   ePHEDrine injection 30 mg   fentaNYL PF 0.05 mg/mL 25 mcg   LR bolus Cannot be calculated   lidocaine (Xylocaine) 1 % 30 mg   mepivacaine PF (Carbocaine) 1.5 % 3.5 mL   midazolam (Versed) 1 mg/1 mL 1 mg   propofol (Diprivan) injection 10 mg/mL 316.59 mg   tranexamic acid injection 100 mg/mL 1,000 mg   ceFAZolin (Ancef) 2 g in dextrose (iso)  mL 2 g              Anesthesia Record               Intraprocedure I/O Totals          Intake    Tranexamic Acid 0.00 mL    The total shown is the total volume documented since Anesthesia Start was filed.    ceFAZolin (Ancef) 2 g in dextrose (iso)  mL 100.00 mL    Total Intake 100 mL       Output    Est. Blood Loss 25 mL    Total Output 25 mL       Net    Net Volume 75 mL          Specimen: No specimens collected              Drains and/or Catheters:   Closed/Suction Drain Left Knee (Active)       Tourniquet Times:   *  Missing tourniquet times found for documented tourniquets in lo *     Implants:  Implants       Type Name Action Serial No.      Joint Knee BONE CEMENT, SMART SET, HIGH VISCOSITY, 40GM - [id number] Implanted      Joint Knee BONE CEMENT, SMART SET, HIGH VISCOSITY, 40GM - [id number] Implanted      Joint Knee INSERT, ATTUNE PS FB, SZ 6, 6MM - [id number] Implanted      Joint Knee FEMORAL, ATTUNE PS, MIGUEL, NRW, SZ 6, LT - [id number] Implanted      Joint Knee DOME, PATELLA, MEDIALIZED, 35MM - [id number] Implanted      Joint Knee TIBAL BASE ATTUNE FB, SZ 4 MIGUEL - [id number] Implanted               Findings: advanced OA    Indications: Lydia Limon is an 79 y.o. female who is having surgery for Unilateral primary osteoarthritis, left knee [M17.12].     The patient was seen in the preoperative area. The risks, benefits, complications, treatment options, non-operative alternatives, expected recovery and outcomes were discussed with the patient. The possibilities of reaction to medication, pulmonary aspiration, injury to surrounding structures, bleeding, recurrent infection, the need for additional procedures, failure to diagnose a condition, and creating a complication requiring transfusion or operation were discussed with the patient. The patient concurred with the proposed plan, giving informed consent.  The site of surgery was properly noted/marked if necessary per policy. The patient has been actively warmed in preoperative area. Preoperative antibiotics have been ordered and given within 1 hours of incision. Venous thrombosis prophylaxis have been ordered including bilateral sequential compression devices    Procedure Details: L TKA  Complications:  None; patient tolerated the procedure well.    Disposition: PACU - hemodynamically stable.  Condition: stable     PREOPERATIVE DIAGNOSIS:  left knee osteoarthritis     POSTOPERATIVE DIAGNOSIS:  left knee osteoarthritis     OPERATION/PROCEDURE:  left total knee  arthroplasty     SURGEON:  Trell Kaur MD     ASSISTANT(S):  PASTOR Toure    The patient's surgery was assisted by DG Toure, due to lack of availability of a qualified resident to assist with the surgery.  Irene Goncalves was present for the essential parts of the procedure.  She acted as first assistant and assisted with preparing the leg, draping the leg, exposing the knee, retracting and manipulating the leg during surgery, facilitating safe performance of the procedure, and closure of the wound.     ANESTHESIA:  Spinal, adductor canal block     LOCATION:  [facility]     ESTIMATED BLOOD LOSS AND INTRAVENOUS FLUIDS:  Please see Anesthesia record.     COMPONENTS USED:  DePuy Vantrix knee system  1. 6N femur  2. 4 tibia  3. 35 patella  4. 6mm insert     BRIEF CLINICAL NOTE:  The patient is a 80 yo female with advanced osteoarthritis of  their left knee.  They failed conservative treatment and wished to  proceed with total knee arthroplasty which is indicated at this time.  We discussed the risks, benefits, and alternatives of surgery  including but not limited to infection, damage to vessel or nerve,  bleeding, soft tissue pain, DVT, PE, problems with anesthesia, lack  of range of motion, continued soft tissue pain, need for further  surgery, etc.  Consent was obtained.  They were taken to the operating  room in order to undergo the procedure.    PRE-OP ROM:      OPERATIVE REPORT:  The patient was transferred to the operating room table.  Time-out  was performed confirming patient name, medical record number,  surgical site, and adequate and appropriate imaging.  The patient  received appropriate IV antibiotics as well as tranexamic acid.  Once we were prepped and draped,midline skin incision was performed.  Hemostasis was obtained using electrocautery.  Underlying extensor mechanism was easily identified and entered using a standard medial parapatellar arthrotomy performedsharply.  The infrapatellar  and suprapatellar fat pads were debrided.Initial medial release was performed.  The patella was subluxed laterally.  Distal femur was entered using a step drill.  Distal  femoral cut was performed, and the femur was sized and prepared.  The tibia was subluxed forward using a double-prong PCL retractor.  The proximal tibia was cut in neutral mechanical axis using an  extramedullary tibial guide.  We then used the lamina  to clear out the posterior osteophytes and clear the meniscal remnants. We then sized the tibia and prepared it. We cut the patella freehand using a caliper to restore the native patellar height. We then trialed with multiple polyethylene inserts.  Once I was happy with the position of components and stability of the knee, all trial components were removed.  The  cut bony surfaces were thoroughly irrigated and dried.  The posterior capsule and surrounding soft tissues were injected with  SHUKRI solution.  We then cemented into place the real components.  The knee was held in extension until all cement was hardened.  All extraneous cement was  removed.  We then closed the extensor mechanism over a drain using interrupted #2 Ethibond as well as interrupted 0 Vicryl.  The subcu was closed with interrupted 2-0 Vicryl.  The skin was closed with  running 3-0 Biosyn followed by Dermabond and Steri-Strips.  Dry sterile dressing was placed.  The patient was transferred back to the hospital bed without incident or complication.  She will be  weightbearing as tolerated.  They will be on ASA and SCDs for DVT  prophylaxis.     Additional Details: WBAT, ASA    Attending Attestation: I was present and scrubbed for the key portions of the procedure.

## 2025-04-02 NOTE — CONSULTS
Consults    Reason For Consult  Medical management for history of arthritis hyperlipidemia and postop pain  History Of Present Illness  Lydia Limon is a 79 y.o. female presenting with .  Left total knee replacement patient is hemodynamically stable upon arrival from PACU     Past Medical History  She has a past medical history of Arthritis, Cataract, Hearing aid worn, HL (hearing loss), Hyperlipidemia, Metatarsalgia, unspecified foot, and Personal history of diseases of the skin and subcutaneous tissue.    She has no past medical history of Anemia, Asthma, Awareness under anesthesia, Cerebral vascular accident (Multi), CHF (congestive heart failure), Chronic kidney disease, COPD (chronic obstructive pulmonary disease) (Multi), Coronary artery disease, Delayed emergence from general anesthesia, Disease of thyroid gland, GERD (gastroesophageal reflux disease), History of blood transfusion, Hypertension, Malignant hyperthermia, Myocardial infarction (Multi), PONV (postoperative nausea and vomiting), Pseudocholinesterase deficiency, Refusal of blood product, Seizure disorder (Multi), Sleep apnea, Stroke (Multi), TIA (transient ischemic attack), or Type 2 diabetes mellitus.    Surgical History  She has a past surgical history that includes Other surgical history (Left, 10/14/2013); Tubal ligation (02/14/2017); and Other surgical history (02/18/2020).     Social History  She reports that she has never smoked. She has never used smokeless tobacco. She reports that she does not drink alcohol and does not use drugs.    Family History  Family History   Problem Relation Name Age of Onset    Aortic dissection Mother      Heart disease Father      Heart attack Neg Hx      Stroke Neg Hx          Allergies  Patient has no known allergies.    Review of Systems  10 system review noncontributory  Physical Exam  Patient is alert in no acute distress  Lungs are clear to auscultation and percussion  Cardiac is regular rate and  rhythm normal S1-S2 without murmur gallop rub click S3 or S4  Abdomen is soft nontender positive bowel sounds there is no hepatosplenomegaly or CVA tenderness appreciate  Extremities without cyanosis clubbing erythema or edema  Operative site exam per Ortho extremities warm pulses are intact patient can dorsiflex plantarflex  Last Recorded Vitals  /73 (BP Location: Right arm, Patient Position: Lying)   Pulse 59   Temp 36.3 °C (97.3 °F) (Temporal)   Resp 16   Wt 49 kg (108 lb)   SpO2 94%     Relevant Results  Scheduled medications  acetaminophen, 650 mg, oral, q6h CHEPE  [START ON 4/3/2025] aspirin, 81 mg, oral, BID  [START ON 4/3/2025] atorvastatin, 10 mg, oral, Daily  ceFAZolin, 2 g, intravenous, q8h  docusate sodium, 100 mg, oral, BID  ketorolac, 15 mg, intravenous, q6h  [START ON 4/3/2025] pantoprazole, 40 mg, oral, Daily before breakfast  [START ON 4/3/2025] polyethylene glycol, 17 g, oral, Daily  scopolamine, 1 patch, transdermal, q72h      Continuous medications  lactated Ringer's, 50 mL/hr      PRN medications  PRN medications: benzocaine-menthol, bisacodyl, bisacodyl, cyclobenzaprine, HYDROmorphone, metoclopramide **OR** metoclopramide, naloxone, ondansetron ODT **OR** ondansetron, oxyCODONE, oxyCODONE, oxygen    No results found for this or any previous visit (from the past 24 hours).       Assessment/Plan   Status post left total knee replacement  -Management per Ortho  -PT  -Pain control  -Supportive care    Hyperlipidemia  -Continue current medications    DVT prophylaxis  -SCDs  -Aspirin protocol  -Ambulate      Michell Benoit MD

## 2025-04-02 NOTE — CARE PLAN
Problem: Balance  Goal: LTG - Patient will maintain standing and sitting balance to allow for completion of daily activities  Outcome: Progressing     Problem: Mobility  Goal: LTG - Patient will ambulate community distance using rolling walker with distant supervision.   Outcome: Progressing  Goal: LTG - Patient will navigate 4 steps using rails/device with close supervision  Outcome: Progressing     Problem: Safety  Goal: LTG - Patient will demonstrate safety requirements appropriate to situation/environment  Outcome: Progressing     Problem: PT Transfers  Goal: LTG - Patient will demonstrate safe transfer techniques using rolling walker with distant supervision.   Outcome: Progressing     Problem: Pain  Goal: LTG - Patient will manage pain with the appropriate technique/Intervention  Outcome: Progressing     Problem: Compromised Skin Integrity  Goal: LTG - Patient will be free from infection  Outcome: Progressing      [facility] Daily Progress Note      Subjective and Objective  -pt is seen and examined this morning  Denies cp, sob   C/o bilater le pains  On RA  Spouse at bedside, says she gets confused with morphine  Per  RN report pt was not confused after morphine     On Milrinone 0.25 mcg/kg/min  On lasix drip 10mg/hr    10 point review of systems is negative except otherwise as stated above       Reviewed the labs personally   Cr 1.59  pBNP 93398  Hb 10.1  Elevated free T$ 2.10  TSH 5.0          No data recorded  MAP (mmHg)  Av.7  Min: 72  Max: 78             Intake/Output Summary (Last 24 hours) at 10/7/2022 0808  Last data filed at 10/7/2022 0400  Gross per 24 hour   Intake 480 ml   Output 3000 ml   Net -2520 ml        Vital Last Value 24 Hour Range   Temperature 98.4 °F (36.9 °C) (10/07/22 0541) Temp  Min: 97.7 °F (36.5 °C)  Max: 98.6 °F (37 °C)   Pulse 75 (10/07/22 0541) Pulse  Min: 59  Max: 110   Respiratory 16 (10/07/22 0541) Resp  Min: 16  Max: 18   Non-Invasive  Blood Pressure 94/61 (10/07/22 0541) BP  Min: 94/61  Max: 121/71   Pulse Oximetry 100 % (10/07/22 0541) SpO2  Min: 95 %  Max: 100 %   Arterial   Blood Pressure   No data recorded      Physical Exam:  General:  Alert and oriented.  No acute distress.    Head:  Normocephalic   Neck:  Trachea is midline. No thyromegaly, no lymphadenopathy.   Eyes:  no scleral icterus,  normal conjunctivae   ENT:   Mucous membranes are moist.    Cardiovascular: normal heart sounds,  Regular rate and rhythm, no murmur.  Respiratory:  Clear to auscultation.  Gastrointestinal:  Soft and nontender.  Normal bowel sounds.  No hepatosplenomegaly   Genitourinary:no suprapubic tenderness, no flank tenderness   Skin:  no rash  Musculoskeletal:  No tenderness to palpation.    Extremities: no edema le, wounds to bilateral lower extremities with dressings on   Back:  No spine tenderness.  Neurologic: alert and oriented,  no  focal deficits   Psychiatric:   Cooperative.  Appropriate mood and  affect.  Normal judgment.      Labs:  Recent Labs     10/05/22  0454   WBC 6.9   RBC 4.81   HGB 10.1*   HCT 31.2*      MCV 64.9*   MCH 21.0*   MCHC 32.4   NRBCRE 2*         Recent Labs     10/05/22  0454 10/05/22  0949 10/05/22  2059 10/06/22  0549 10/06/22  0858 10/06/22  2053   SODIUM 139 138   < > 135 136 137   POTASSIUM 4.4 4.9   < > 4.0 4.1 4.3   MG 2.0 1.9  --  2.0  --   --    CO2 27 24   < > 28 29 29   ANIONGAP 10 12   < > 8 10 11   GLUCOSE 178* 297*   < > 161* 207* 167*   BUN 40* 42*   < > 38* 37* 36*   CREATININE 1.79* 1.76*   < > 1.59* 1.56* 1.59*   BCRAT 22 24   < > 24 24 23   CALCIUM 9.1 9.1   < > 9.1 9.2 9.0   BILIRUBIN 1.9*  --   --   --   --   --    AST 16  --   --   --   --   --    GPT 12  --   --   --   --   --    ALKPT 111  --   --   --   --   --    GLOB 3.9  --   --   --   --   --    AGR 0.7*  --   --   --   --   --     < > = values in this interval not displayed.        Recent Labs   Lab 10/06/22  0549   NTPROB 13,112*        No results for input(s): INR, PT, PTT in the last 72 hours.        Inpatient Medications:  Current Facility-Administered Medications   Medication Dose Route Frequency Provider Last Rate Last Admin   • SITagliptan (JANUVIA) tablet 50 mg  50 mg Oral Daily Vivien Grullon MD       • insulin lispro (ADMELOG,HumaLOG) - Correction Dose   Subcutaneous TID  Vivien Grullon MD       • atorvastatin (LIPITOR) tablet 20 mg  20 mg Oral Daily Iram Peña MD   20 mg at 10/06/22 0816   • digoxin (LANOXIN) tablet 125 mcg  125 mcg Oral Every Other Day Iram Peña MD   125 mcg at 10/05/22 0903   • rivaroxaban (XARELTO) tablet 20 mg  20 mg Oral Daily Iram Peña MD   20 mg at 10/06/22 0816   • sodium chloride (PF) 0.9 % injection 2 mL  2 mL Intracatheter 2 times per day Iram Peña MD   2 mL at 10/06/22 2000   • insulin lispro (ADMELOG,HumaLOG) - Correction Dose   Subcutaneous Nightly Iram Peña MD          Current Facility-Administered Medications   Medication Dose Route  Frequency Provider Last Rate Last Admin   • furosemide (LASIX) 250 mg in NaCl 0.9% 125 mL infusion  20 mg/hr Intravenous Continuous Anita Boykin, CNP 10 mL/hr at 10/07/22 0239 20 mg/hr at 10/07/22 0239   • milrinone (PRIMACOR) 20 mg/100 mL in dextrose 5 % infusion premix  0.25 mcg/kg/min (Order-Specific) Intravenous Continuous Iram Peña MD 6.7 mL/hr at 10/06/22 1659 0.25 mcg/kg/min at 10/06/22 1659      Current Facility-Administered Medications   Medication Dose Route Frequency Provider Last Rate Last Admin   • morphine injection 2 mg  2 mg Intravenous Q4H PRN Olive Mustafa MD   2 mg at 10/06/22 1718   • HYDROcodone-acetaminophen (NORCO)  MG per tablet 1 tablet  1 tablet Oral Q6H PRN Olive Mustafa MD   1 tablet at 10/07/22 0728   • polyethylene glycol (MIRALAX) packet 17 g  17 g Oral Daily PRN Olive Mustafa MD       • docusate sodium (COLACE) capsule 100 mg  100 mg Oral BID PRN Olive Mustafa MD       • dextrose 50 % injection 25 g  25 g Intravenous PRN Iram Peña MD       • dextrose 50 % injection 12.5 g  12.5 g Intravenous PRN Iram Peña MD       • glucagon (GLUCAGEN) injection 1 mg  1 mg Intramuscular PRN Iram Peña MD       • dextrose (GLUTOSE) 40 % gel 15 g  15 g Oral PRN Iram Peña MD       • dextrose (GLUTOSE) 40 % gel 30 g  30 g Oral PRN Iram Peña MD       • sodium chloride 0.9 % flush bag 25 mL  25 mL Intravenous PRN Iram Peña MD       • sodium chloride (NORMAL SALINE) 0.9 % bolus 500 mL  500 mL Intravenous PRN Iram Peña MD       • acetaminophen (TYLENOL) tablet 650 mg  650 mg Oral Q6H PRN Iram Peña MD   650 mg at 10/05/22 0505   • ondansetron (ZOFRAN) injection 4 mg  4 mg Intravenous Q6H PRN Iram Peña MD            Assessment and Plan:    Sushila is 58 yr old F with pmh of dm, htn, NICM, Chronic HFrEF,  Afib, ckd3 required temporary HD in 9/2021, , hyperlipidemia, former smoker quit 26 yrs ago , hypothyroidism, chronic anemia,  KARELY, GERD,   Gout,  depression, chronic pain, DVT, obesity, gastric bypass  And osmetic abdominoplasty 10/2002, ,  chronic venous stasis, venous ulcers, s/p RF ablation of veins in 6/2022, Aortic occlusion with bilateral lower extremity ischemia status post left axillary  bilateral femoral bypass graft, bilateral popliteal/ tibial embolectomy  and bilateral lower extremity compartment fasciotomy fasciotomy 9/9/ 2021, bilateral lower extremity wound debridement 10/2021 and 11/2021, syndrome, femoral bypass, presented to [facility] on 10/4  for elective right heart cath and CardioMEMS.  Underwent RHC/CardioMEMS. RHC showed severely reduced cardiac output, significantly elevated filling pressures moderate pulmonary hypertension.  Patient is transferred to [facility] for higher level of care.  Has exertional shortness of breath but denies chest pain        Acute on chronic HFrEF, NICM  -Hypervolemic at admission  -Multiple hospitalisations for CHF  -TTE 4/2022: EF 30%,   -[facility] 2015; normal coronaries  -RHC/CardioMEMS on 10/4/2022: Severely reduced cardiac output, significantly elevated filling pressures and moderate pulmonary hypertension  -Inotropes: started on milrinone   -Diuretics: lasix drip  -GDMT: none   -Other Mes; on dig   -Management per AHF service     Chronic A fib  -cont xarelto     CKD3  -Previously required hemodialysis and 9/2021  -Baseline creatinine 1.6-1.8  -Creatinine 1.79 at her baseline upon admission  -Monitor renal functions     Hypertension and hypertensive kidney disease  -Blood pressure controlled without meds  -Monitor BP        DM with neuropathy  Hyperlipidemia   -A1C 6.8 on 5/17/22  -Home meds: none   -cont LSS  -Cont statin    Abnormal Thyroid function tests  -TSH 5.0, Free T4 2.1  -pt is not on thyroid supplements  -Questional hx of thyroid disorder  -Endo consult     PVD  S/P left axillobifemoral bypass 9/2021  Poor peripheral pulses   - Aortic occlusion with bilateral lower extremity ischemia status post left  axillary  bilateral femoral bypass graft, bilateral popliteal/ tibial embolectomy  and bilateral lower extremity compartment fasciotomy fasciotomy 9/9/ 2021, bilateral lower extremity wound debridement 10/2021 and 11/2021, syndrome, femoral bypass  -cont statin and xarelto  -CTA LE 8/22/22: Occlusion of the distal abdominal aorta.  Patent left axillofemoral bypass graft and patent femoral-femoral bypass graft.  No occlusion or hemodynamically significant stenosis of the bilateral lower extremity  arteries.  -DUANE ordered        Chronic bilateral lower extremity wounds  chronic venous stasis, s/p RF ablation of veins in 6/2022,  Hx of debridements in sep and October 2021  -Wound care physician on consult, appreciate assistance   -per wound physician note:  Left leg:  with lateral fasciotomy healed,   Medial fasciotomy with pale pink wound, No significant drainage, edema+  Right leg:  with small superficial wound in the lateral calf,  And large ulcer in the medial calf, with mostly dark red granular base,   Inferior portion with exposed achilles tendon, necrosis present, edema+  -Woundcare physician rec: xrays to the right heel to rule out bone involvement and infection,  dressings: white mepilex foams, and abd and kerlix,   -depending on arterial studies results, will add compression with ace bandage     Chronic pain lenard LE  Chronic opioid usee  -takes 2 to 4 per days   -cont norco and iv morphine prn  -started on gabapentin on 10/7 and decreased Morphine frequency to q 6 hrs prn  -on bowel regimen     Moderate protein calorie malnutrition  -follow Nutrition service recommendations      Deconditioning  -PT/OT eval        ALLERGIES:  Patient has no known allergies.     DVT prophylaxis: SCD's, on xarelto  Code Status: Full Resuscitation    Primary Care Provider: Mauro Bustillo DO   Communication:pt, RN     DC Plan: Home with HH and woundcare, possibly milrinone   HH form in   Pt lives with spouse  Ambulates with Walker    Doesn' use Home o2        Time spent 35 minutes   Greater than 50% of the time spent reviewing the patient records, coordinating patient care plan and discussing the above care plan  with the patient.  Reviewed all vitals, medications, new orders, I/O, labs, micro, radiology, nurses notes, pertinent consultant notes which are reflected in assessment and plan     Patient Privacy Notice:  The 21st Century Cures Act makes medical notes like these available to patients in the interest of transparency. Please be advised that this is a medical document. Medical documents are intended to carry relevant information, facts as evident, and the clinical opinion of the practitioner. The medical note is intended as peer to peer communication, and may appear blunt or direct. It is written in medical language, and may contain abbreviations or verbiage that are unfamiliar.    Olive Mustafa MD  10/7/2022 8:08 AM

## 2025-04-03 ENCOUNTER — APPOINTMENT (OUTPATIENT)
Dept: CARDIOLOGY | Facility: HOSPITAL | Age: 80
End: 2025-04-03
Payer: MEDICARE

## 2025-04-03 VITALS
HEART RATE: 55 BPM | WEIGHT: 108 LBS | RESPIRATION RATE: 18 BRPM | HEIGHT: 65 IN | TEMPERATURE: 96.8 F | SYSTOLIC BLOOD PRESSURE: 108 MMHG | BODY MASS INDEX: 17.99 KG/M2 | DIASTOLIC BLOOD PRESSURE: 64 MMHG | OXYGEN SATURATION: 100 %

## 2025-04-03 PROBLEM — M17.12 UNILATERAL PRIMARY OSTEOARTHRITIS, LEFT KNEE: Status: RESOLVED | Noted: 2025-02-02 | Resolved: 2025-04-03

## 2025-04-03 LAB
ANION GAP SERPL CALC-SCNC: 12 MMOL/L (ref 10–20)
BUN SERPL-MCNC: 16 MG/DL (ref 6–23)
CALCIUM SERPL-MCNC: 9.2 MG/DL (ref 8.6–10.3)
CHLORIDE SERPL-SCNC: 102 MMOL/L (ref 98–107)
CO2 SERPL-SCNC: 26 MMOL/L (ref 21–32)
CREAT SERPL-MCNC: 0.82 MG/DL (ref 0.5–1.05)
EGFRCR SERPLBLD CKD-EPI 2021: 74 ML/MIN/1.73M*2
ERYTHROCYTE [DISTWIDTH] IN BLOOD BY AUTOMATED COUNT: 12.5 % (ref 11.5–14.5)
GLUCOSE SERPL-MCNC: 118 MG/DL (ref 74–99)
HCT VFR BLD AUTO: 32.8 % (ref 36–46)
HGB BLD-MCNC: 10.8 G/DL (ref 12–16)
MCH RBC QN AUTO: 29.3 PG (ref 26–34)
MCHC RBC AUTO-ENTMCNC: 32.9 G/DL (ref 32–36)
MCV RBC AUTO: 89 FL (ref 80–100)
NRBC BLD-RTO: ABNORMAL /100{WBCS}
PLATELET # BLD AUTO: 170 X10*3/UL (ref 150–450)
POTASSIUM SERPL-SCNC: 3.7 MMOL/L (ref 3.5–5.3)
RBC # BLD AUTO: 3.69 X10*6/UL (ref 4–5.2)
SODIUM SERPL-SCNC: 136 MMOL/L (ref 136–145)
WBC # BLD AUTO: 8.5 X10*3/UL (ref 4.4–11.3)

## 2025-04-03 PROCEDURE — 97116 GAIT TRAINING THERAPY: CPT | Mod: GP

## 2025-04-03 PROCEDURE — 97110 THERAPEUTIC EXERCISES: CPT | Mod: GP

## 2025-04-03 PROCEDURE — 7100000011 HC EXTENDED STAY RECOVERY HOURLY - NURSING UNIT

## 2025-04-03 PROCEDURE — 2500000004 HC RX 250 GENERAL PHARMACY W/ HCPCS (ALT 636 FOR OP/ED): Performed by: PHYSICIAN ASSISTANT

## 2025-04-03 PROCEDURE — 36415 COLL VENOUS BLD VENIPUNCTURE: CPT | Performed by: PHYSICIAN ASSISTANT

## 2025-04-03 PROCEDURE — 85027 COMPLETE CBC AUTOMATED: CPT | Performed by: PHYSICIAN ASSISTANT

## 2025-04-03 PROCEDURE — 2500000001 HC RX 250 WO HCPCS SELF ADMINISTERED DRUGS (ALT 637 FOR MEDICARE OP): Performed by: PHYSICIAN ASSISTANT

## 2025-04-03 PROCEDURE — 80048 BASIC METABOLIC PNL TOTAL CA: CPT | Performed by: PHYSICIAN ASSISTANT

## 2025-04-03 PROCEDURE — 93005 ELECTROCARDIOGRAM TRACING: CPT

## 2025-04-03 PROCEDURE — 99221 1ST HOSP IP/OBS SF/LOW 40: CPT | Performed by: EMERGENCY MEDICINE

## 2025-04-03 RX ADMIN — ACETAMINOPHEN 650 MG: 325 TABLET ORAL at 02:21

## 2025-04-03 RX ADMIN — CEFAZOLIN SODIUM 2 G: 2 INJECTION, SOLUTION INTRAVENOUS at 02:21

## 2025-04-03 RX ADMIN — KETOROLAC TROMETHAMINE 15 MG: 15 INJECTION, SOLUTION INTRAMUSCULAR; INTRAVENOUS at 02:21

## 2025-04-03 RX ADMIN — POLYETHYLENE GLYCOL 3350 17 G: 17 POWDER, FOR SOLUTION ORAL at 10:21

## 2025-04-03 RX ADMIN — PANTOPRAZOLE SODIUM 40 MG: 40 TABLET, DELAYED RELEASE ORAL at 06:48

## 2025-04-03 RX ADMIN — ASPIRIN 81 MG: 81 TABLET, COATED ORAL at 10:19

## 2025-04-03 RX ADMIN — DOCUSATE SODIUM 100 MG: 100 CAPSULE, LIQUID FILLED ORAL at 10:20

## 2025-04-03 RX ADMIN — ATORVASTATIN CALCIUM 10 MG: 20 TABLET, FILM COATED ORAL at 10:20

## 2025-04-03 RX ADMIN — KETOROLAC TROMETHAMINE 15 MG: 15 INJECTION, SOLUTION INTRAMUSCULAR; INTRAVENOUS at 10:20

## 2025-04-03 RX ADMIN — ACETAMINOPHEN 650 MG: 325 TABLET ORAL at 10:19

## 2025-04-03 ASSESSMENT — COGNITIVE AND FUNCTIONAL STATUS - GENERAL
WALKING IN HOSPITAL ROOM: A LITTLE
MOBILITY SCORE: 22
MOBILITY SCORE: 20
DRESSING REGULAR LOWER BODY CLOTHING: A LITTLE
CLIMB 3 TO 5 STEPS WITH RAILING: A LITTLE
DAILY ACTIVITIY SCORE: 23
CLIMB 3 TO 5 STEPS WITH RAILING: A LITTLE
WALKING IN HOSPITAL ROOM: A LITTLE
MOVING TO AND FROM BED TO CHAIR: A LITTLE
STANDING UP FROM CHAIR USING ARMS: A LITTLE

## 2025-04-03 ASSESSMENT — PAIN SCALES - GENERAL
PAINLEVEL_OUTOF10: 0 - NO PAIN
PAINLEVEL_OUTOF10: 0 - NO PAIN

## 2025-04-03 ASSESSMENT — PAIN - FUNCTIONAL ASSESSMENT: PAIN_FUNCTIONAL_ASSESSMENT: 0-10

## 2025-04-03 NOTE — PROGRESS NOTES
Medication Education     Medication education for Lydia Limon was provided to the patient  for the following medication(s):  Aspirin  Oxycodone  Tramadol  Tylenol  Meloxicam  Protonix  Docusate  Miralax---returned, will use home supply.      Medication education provided by a Pharmacist:  Dose, frequency, storage Proper dose, indication, possible ADRs Refilling the medication  How the medication works and benefits of taking it Benefits of taking the medication  Importance of compliance    Identified potential barriers to education:  None    Method(s) of Education:  Verbal Written materials provided and reviewed    An opportunity to ask questions and receive answers was provided.     Assessment of understanding the patient :  2= meets goals/outcomes    Additional Notes (if applicable):     M2B delivered.  May need repeat discussion, concerned about # of meds to remember.    Elier Lorenzana, PharmD

## 2025-04-03 NOTE — CARE PLAN
The patient's goals for the shift include pain management.    The clinical goals for the shift include pain management

## 2025-04-03 NOTE — NURSING NOTE
Patient to be discharged home with her . Reviewed discharge instructions and med list with patient and . They both acknowledged understanding. Patient was escorted to family car via WC by nursing staff.

## 2025-04-03 NOTE — CARE PLAN
The patient's goals for the shift include pain management.    The clinical goals for the shift include pain management    Over the shift, the patient did make progress toward the following goals.  Recommendations to address these barriers include consistent pain medication and cold therapy.

## 2025-04-03 NOTE — PROGRESS NOTES
Physical Therapy Treatment    Patient Name: Lydia Limon  MRN: [id number]  Department:   [facility]  Today's Date: 4/3/2025  Time Calculation  Start Time: 1048  Stop Time: 1117  Time Calculation (min): 29 min         Assessment/Plan   PT Assessment  PT Assessment Results: Decreased strength, Decreased range of motion, Orthopedic restrictions, Impaired balance  Rehab Prognosis: Good  Barriers to Discharge Home: No anticipated barriers  Evaluation/Treatment Tolerance: Patient tolerated treatment well  End of Session Communication: Bedside nurse  Assessment Comment: Pt ambulated commnunity distance with RW this date. Pt is distractible and had one small LOB today, therefore PT is recommending 24 hour supervision and HHPT.  End of Session Patient Position: Up in chair, Alarm on, BLE elevated, ice to surgical site, call light in reach, needs met, RN aware.  PT Plan  Inpatient/Swing Bed or Outpatient: Inpatient  PT Plan  Treatment/Interventions: Bed mobility, Transfer training, Gait training, Stair training, Balance training, Strengthening, Endurance training, Range of motion, Therapeutic activity, Therapeutic exercise, Home exercise program, Positioning  PT Plan: Ongoing PT  PT Eval Only Reason: Safe to return home  PT Frequency: BID  PT Discharge Recommendations: Low intensity level of continued care  Equipment Recommended upon Discharge: Wheeled walker  PT Recommended Transfer Status: Stand by assist, Assistive device  PT - OK to Discharge: Yes (with 24 hour supervision)      General Visit Information:   PT  Visit  PT Received On: 04/03/25  Response to Previous Treatment: Patient with no complaints from previous session.  General  Family/Caregiver Present: No  Prior to Session Communication: Bedside nurse  Patient Position Received: Bed, 3 rail up, Alarm on  General Comment: RN had removed drain prior to PT arrival.    Subjective   Precautions:  Precautions  LE Weight Bearing Status: Weight Bearing as  Tolerated  Medical Precautions: Fall precautions  Post-Surgical Precautions: Left total knee precautions      Objective   Pain:  Pain Assessment  Pain Assessment: 0-10  0-10 (Numeric) Pain Score: 0 - No pain    Activity Tolerance:  Activity Tolerance  Endurance: Endurance does not limit participation in activity  Treatments:  Therapeutic Exercise  Therapeutic Exercise Performed: Yes  B ankle pumps, L quad sets, L gluteal sets, L heel slides, L SAQ, L hip abduction, and L SLR x 10 reps each.    Therapeutic Activity  Therapeutic Activity Performed: Yes  PT instructed patient in donning underwear and pants onto surgical LE first. Pt was able to complete with cuing only.     Bed Mobility  Bed Mobility: Yes  Bed Mobility 1  Bed Mobility 1: Supine to sitting  Level of Assistance 1: Independent    Ambulation/Gait Training  Ambulation/Gait Training Performed: Yes  Ambulation/Gait Training 1  Surface 1: Level tile  Device 1: Rolling walker  Assistance 1: Close supervision, Minimum assistance, Minimal verbal cues (cues to remain in SATYA of AD; pt with one LOB requiring cues from PT and brief moment of min A to correct - during turning.)  Quality of Gait 1: Decreased step length (decreased kathleen, reciprocal stepping.)  Comments/Distance (ft) 1: 150 feet x 1    Transfers  Transfer: Yes  Transfer 1  Transfer From 1: Bed to, Chair with arms to  Transfer to 1: Chair with arms  Technique 1: Sit to stand, Stand to sit  Transfer Device 1: Walker  Transfer Level of Assistance 1: Close supervision, Minimal verbal cues (cues for hand placement)    Stairs  Stairs: Yes  Stairs  Rails 1: Left (BUE support on one rail to simulate)  Device 1: Railing  Assistance 1: Close supervision, Minimal verbal cues (cues for sequencing)  Comment/Number of Steps 1: 3  Pt demonstrated step to pattern, decreased kathleen, no LOB noted.     Outcome Measures:  [facility] Basic Mobility  Turning from your back to your side while in a flat bed without using  bedrails: None  Moving from lying on your back to sitting on the side of a flat bed without using bedrails: None  Moving to and from bed to chair (including a wheelchair): None  Standing up from a chair using your arms (e.g. wheelchair or bedside chair): None  To walk in hospital room: A little  Climbing 3-5 steps with railing: A little  Basic Mobility - Total Score: 22    Encounter Problems       Encounter Problems (Active)       Balance       LTG - Patient will maintain standing and sitting balance to allow for completion of daily activities (Progressing)       Start:  04/02/25               Compromised Skin Integrity       LTG - Patient will be free from infection (Progressing)       Start:  04/02/25               Mobility       LTG - Patient will ambulate community distance using rolling walker with distant supervision.  (Progressing)       Start:  04/02/25            LTG - Patient will navigate 4 steps using rails/device with close supervision (Progressing)       Start:  04/02/25               PT Transfers       LTG - Patient will demonstrate safe transfer techniques using rolling walker with distant supervision.  (Progressing)       Start:  04/02/25               Pain          Pain - Adult          Safety       LTG - Patient will demonstrate safety requirements appropriate to situation/environment (Progressing)       Start:  04/02/25                 Marika Pena, PT, DPT

## 2025-04-03 NOTE — NURSING NOTE
Assumed care of patient. Meds and labs reviewed. Patient is awake in bed during bedside report. Breakfast ordered. No c/o of pain or discomfort voiced. Reviewed the plan of care with patient which includes participation in PT and receiving meds from pharmacy to take home. Patient acknowledged understanding.

## 2025-04-03 NOTE — PROGRESS NOTES
Interdisciplinary Rounds were completed at the bedside with Patient.  Staff participating in rounds included: Clinical Nurse Orthopedic Coordinator Nursing Leadership.  Topics discussed included: Today's Plan of Care Discharge Plan and Accommodations Physical Therapy Medications/Preferred Pharmacy and the Patient was given the opportunity to ask additional questions or bring up any concerns at that time.  During the final discharge discussion and review of instructions, they will have another opportunity to review questions or concerns prior to leaving our care.  Patient was given information on who to call post-discharge should new questions or concerns arise.      At the time of this discussion, the patient's plan includes:    Discharge Date/Disposition:  Home, Today with, Home Care Services  Discharge Needs: No Equipment Needs Identified  Medications/Pharmacy: [id number] service utilized for discharge prescriptions through  [facility]

## 2025-04-03 NOTE — CONSULTS
Consults    Reason For Consult  Medical management for history of arthritis hyperlipidemia and management of postop pain    History Of Present Illness  Lydia Limon is a 79 y.o. female presenting with left total knee replacement she has remained hemodynamically stable postoperatively and is medically stable for discharge to home.     Past Medical History  She has a past medical history of Arthritis, Cataract, Hearing aid worn, HL (hearing loss), Hyperlipidemia, Metatarsalgia, unspecified foot, and Personal history of diseases of the skin and subcutaneous tissue.    She has no past medical history of Anemia, Asthma, Awareness under anesthesia, Cerebral vascular accident (Multi), CHF (congestive heart failure), Chronic kidney disease, COPD (chronic obstructive pulmonary disease) (Multi), Coronary artery disease, Delayed emergence from general anesthesia, Disease of thyroid gland, GERD (gastroesophageal reflux disease), History of blood transfusion, Hypertension, Malignant hyperthermia, Myocardial infarction (Multi), PONV (postoperative nausea and vomiting), Pseudocholinesterase deficiency, Refusal of blood product, Seizure disorder (Multi), Sleep apnea, Stroke (Multi), TIA (transient ischemic attack), or Type 2 diabetes mellitus.    Surgical History  She has a past surgical history that includes Other surgical history (Left, 10/14/2013); Tubal ligation (02/14/2017); and Other surgical history (02/18/2020).     Social History  She reports that she has never smoked. She has never used smokeless tobacco. She reports that she does not drink alcohol and does not use drugs.    Family History  Family History   Problem Relation Name Age of Onset    Aortic dissection Mother      Heart disease Father      Heart attack Neg Hx      Stroke Neg Hx          Allergies  Patient has no known allergies.    Review of Systems  10 system review noncontributory  Physical Exam  Patient is alert in no acute distress  Lungs are clear to  auscultation and percussion  Cardiac is regular rate and rhythm normal S1-S2 without murmur gallop rub click S3 or S4  Abdomen is soft nontender positive bowel sounds there is no hepatosplenomegaly or CVA tenderness appreciated  Extremities without cyanosis clubbing erythema or edema  Operative site exam per Ortho pulses are intact extremities warm patient can dorsiflex plantarflex  Last Recorded Vitals  /64 (BP Location: Right arm, Patient Position: Lying)   Pulse 55   Temp 36 °C (96.8 °F) (Temporal)   Resp 18   Wt 49 kg (108 lb)   SpO2 100%     Relevant Results  No current facility-administered medications on file prior to encounter.     Current Outpatient Medications on File Prior to Encounter   Medication Sig Dispense Refill    ascorbic acid, vitamin C, (Sherrie-C) crystals Take by mouth.      aspirin 81 mg EC tablet Take by mouth.      calcium carbonate-vitamin D3 (Caltrate 600 plus D) 600 mg-20 mcg (800 unit) tablet,chewable Chew 2 tablets once daily.      ibuprofen (AdviL) 200 mg tablet every 6 hours.      multivitamin tablet Take by mouth.      vitamin E 180 mg (400 unit) capsule Take 1 capsule (400 Units) by mouth once daily.       Results for orders placed or performed during the hospital encounter of 04/02/25 (from the past 24 hours)   CBC   Result Value Ref Range    WBC 8.5 4.4 - 11.3 x10*3/uL    nRBC      RBC 3.69 (L) 4.00 - 5.20 x10*6/uL    Hemoglobin 10.8 (L) 12.0 - 16.0 g/dL    Hematocrit 32.8 (L) 36.0 - 46.0 %    MCV 89 80 - 100 fL    MCH 29.3 26.0 - 34.0 pg    MCHC 32.9 32.0 - 36.0 g/dL    RDW 12.5 11.5 - 14.5 %    Platelets 170 150 - 450 x10*3/uL   Basic metabolic panel   Result Value Ref Range    Glucose 118 (H) 74 - 99 mg/dL    Sodium 136 136 - 145 mmol/L    Potassium 3.7 3.5 - 5.3 mmol/L    Chloride 102 98 - 107 mmol/L    Bicarbonate 26 21 - 32 mmol/L    Anion Gap 12 10 - 20 mmol/L    Urea Nitrogen 16 6 - 23 mg/dL    Creatinine 0.82 0.50 - 1.05 mg/dL    eGFR 74 >60 mL/min/1.73m*2     Calcium 9.2 8.6 - 10.3 mg/dL          Assessment/Plan   Status post left total knee replacement  -Management per Ortho  -PT  -Pain control  -Supportive care     Hyperlipidemia  -Continue current medications     DVT prophylaxis  -SCDs  -Aspirin protocol  -Ambulate    Patient is medically stable for discharge to home    Michell Benoit MD

## 2025-04-03 NOTE — CARE PLAN
Pt POD#1 left knee replacement  Plan is home today with [facility] PT.  SOC confirmed on 4/4/25.  Spouse to transport home and assist with care as needed.

## 2025-04-03 NOTE — SIGNIFICANT EVENT
Patient seen and examined at bedside  Pleasant 79 year old   She was watching TV and reported no pain or other concerns at this time

## 2025-04-03 NOTE — NURSING NOTE
Pt. Vital signs obtained.  Pt. Ordered breakfast.  Call light within reach.  Bed alarm on.  Scd's on.

## 2025-04-04 ENCOUNTER — HOME CARE VISIT (OUTPATIENT)
Dept: HOME HEALTH SERVICES | Facility: HOME HEALTH | Age: 80
End: 2025-04-04
Payer: MEDICARE

## 2025-04-04 VITALS — TEMPERATURE: 98.5 F | DIASTOLIC BLOOD PRESSURE: 64 MMHG | SYSTOLIC BLOOD PRESSURE: 120 MMHG | HEART RATE: 64 BPM

## 2025-04-04 PROCEDURE — 169592 NO-PAY CLAIM PROCEDURE

## 2025-04-04 PROCEDURE — G0151 HHCP-SERV OF PT,EA 15 MIN: HCPCS | Mod: HHH

## 2025-04-04 SDOH — HEALTH STABILITY: PHYSICAL HEALTH: EXERCISE COMMENTS: SITTING AP LAQ HEEL SLIDES  SUINE AP QS GS HEEL SLIDES SAQ X 10 REPS. TOL VERY WELL.

## 2025-04-04 ASSESSMENT — ACTIVITIES OF DAILY LIVING (ADL)
ENTERING_EXITING_HOME: STAND BY ASSIST
OASIS_M1830: 03

## 2025-04-04 ASSESSMENT — ENCOUNTER SYMPTOMS
OCCASIONAL FEELINGS OF UNSTEADINESS: 0
MUSCLE WEAKNESS: 1
PAIN LOCATION: LEFT KNEE
PAIN: 1
DEPRESSION: 0
LOWEST PAIN SEVERITY IN PAST 24 HOURS: 5/10
PAIN LOCATION - PAIN SEVERITY: 7/10
LOSS OF SENSATION IN FEET: 0
LIMITED RANGE OF MOTION: 1
HIGHEST PAIN SEVERITY IN PAST 24 HOURS: 9/10
PERSON REPORTING PAIN: PATIENT

## 2025-04-07 ENCOUNTER — HOME CARE VISIT (OUTPATIENT)
Dept: HOME HEALTH SERVICES | Facility: HOME HEALTH | Age: 80
End: 2025-04-07
Payer: MEDICARE

## 2025-04-07 VITALS — SYSTOLIC BLOOD PRESSURE: 130 MMHG | TEMPERATURE: 97.7 F | DIASTOLIC BLOOD PRESSURE: 62 MMHG

## 2025-04-07 PROCEDURE — G0151 HHCP-SERV OF PT,EA 15 MIN: HCPCS | Mod: HHH

## 2025-04-07 ASSESSMENT — ENCOUNTER SYMPTOMS
PAIN: 1
LOWEST PAIN SEVERITY IN PAST 24 HOURS: 5/10
HIGHEST PAIN SEVERITY IN PAST 24 HOURS: 7/10
PAIN LOCATION - PAIN SEVERITY: 6/10
PERSON REPORTING PAIN: PATIENT
PAIN LOCATION: LEFT KNEE

## 2025-04-09 ENCOUNTER — HOME CARE VISIT (OUTPATIENT)
Dept: HOME HEALTH SERVICES | Facility: HOME HEALTH | Age: 80
End: 2025-04-09
Payer: MEDICARE

## 2025-04-09 VITALS — TEMPERATURE: 98.1 F | SYSTOLIC BLOOD PRESSURE: 144 MMHG | DIASTOLIC BLOOD PRESSURE: 82 MMHG

## 2025-04-09 PROCEDURE — G0151 HHCP-SERV OF PT,EA 15 MIN: HCPCS | Mod: HHH

## 2025-04-09 ASSESSMENT — ENCOUNTER SYMPTOMS
LOWEST PAIN SEVERITY IN PAST 24 HOURS: 3/10
PERSON REPORTING PAIN: PATIENT
PAIN LOCATION: LEFT KNEE
HIGHEST PAIN SEVERITY IN PAST 24 HOURS: 6/10
PAIN: 1
PAIN LOCATION - PAIN SEVERITY: 3/10

## 2025-04-15 ENCOUNTER — HOME CARE VISIT (OUTPATIENT)
Dept: HOME HEALTH SERVICES | Facility: HOME HEALTH | Age: 80
End: 2025-04-15
Payer: MEDICARE

## 2025-04-15 VITALS — DIASTOLIC BLOOD PRESSURE: 80 MMHG | SYSTOLIC BLOOD PRESSURE: 134 MMHG | TEMPERATURE: 97.4 F

## 2025-04-15 PROCEDURE — G0151 HHCP-SERV OF PT,EA 15 MIN: HCPCS | Mod: HHH

## 2025-04-15 ASSESSMENT — ENCOUNTER SYMPTOMS
PAIN LOCATION: LEFT KNEE
PAIN LOCATION - PAIN SEVERITY: 5/10
PERSON REPORTING PAIN: PATIENT
PAIN: 1
LOWEST PAIN SEVERITY IN PAST 24 HOURS: 4/10
HIGHEST PAIN SEVERITY IN PAST 24 HOURS: 7/10
SUBJECTIVE PAIN PROGRESSION: GRADUALLY IMPROVING

## 2025-04-17 ENCOUNTER — HOME CARE VISIT (OUTPATIENT)
Dept: HOME HEALTH SERVICES | Facility: HOME HEALTH | Age: 80
End: 2025-04-17
Payer: MEDICARE

## 2025-04-17 VITALS — SYSTOLIC BLOOD PRESSURE: 138 MMHG | DIASTOLIC BLOOD PRESSURE: 78 MMHG

## 2025-04-17 PROCEDURE — G0151 HHCP-SERV OF PT,EA 15 MIN: HCPCS | Mod: HHH

## 2025-04-17 ASSESSMENT — ENCOUNTER SYMPTOMS
PAIN LOCATION - PAIN SEVERITY: 4/10
PERSON REPORTING PAIN: PATIENT
HIGHEST PAIN SEVERITY IN PAST 24 HOURS: 7/10
LOWEST PAIN SEVERITY IN PAST 24 HOURS: 4/10
PAIN: 1
PAIN LOCATION: LEFT KNEE

## 2025-04-23 ENCOUNTER — HOME CARE VISIT (OUTPATIENT)
Dept: HOME HEALTH SERVICES | Facility: HOME HEALTH | Age: 80
End: 2025-04-23
Payer: MEDICARE

## 2025-04-23 VITALS — DIASTOLIC BLOOD PRESSURE: 86 MMHG | SYSTOLIC BLOOD PRESSURE: 140 MMHG | TEMPERATURE: 97.6 F

## 2025-04-23 PROCEDURE — G0151 HHCP-SERV OF PT,EA 15 MIN: HCPCS | Mod: HHH

## 2025-04-23 ASSESSMENT — ENCOUNTER SYMPTOMS
PAIN: 1
PERSON REPORTING PAIN: PATIENT
PAIN LOCATION: LEFT KNEE
HIGHEST PAIN SEVERITY IN PAST 24 HOURS: 7/10
PAIN LOCATION - PAIN SEVERITY: 4/10
MUSCLE WEAKNESS: 1
LOWEST PAIN SEVERITY IN PAST 24 HOURS: 3/10
LIMITED RANGE OF MOTION: 1

## 2025-04-23 ASSESSMENT — ACTIVITIES OF DAILY LIVING (ADL)
HOME_HEALTH_OASIS: 00
OASIS_M1830: 01

## 2025-05-07 ENCOUNTER — APPOINTMENT (OUTPATIENT)
Dept: PHYSICAL THERAPY | Facility: CLINIC | Age: 80
End: 2025-05-07
Payer: MEDICARE

## 2025-05-07 ENCOUNTER — TELEPHONE (OUTPATIENT)
Dept: PHYSICAL THERAPY | Facility: CLINIC | Age: 80
End: 2025-05-07
Payer: MEDICARE

## 2025-05-07 NOTE — TELEPHONE ENCOUNTER
provider out - called left patient a message ragarding cancelling appointment and to call and reschedule

## 2025-05-15 ENCOUNTER — HOSPITAL ENCOUNTER (OUTPATIENT)
Dept: RADIOLOGY | Facility: CLINIC | Age: 80
Discharge: HOME | End: 2025-05-15
Payer: MEDICARE

## 2025-05-15 ENCOUNTER — OFFICE VISIT (OUTPATIENT)
Dept: ORTHOPEDIC SURGERY | Facility: CLINIC | Age: 80
End: 2025-05-15
Payer: MEDICARE

## 2025-05-15 DIAGNOSIS — Z47.1 AFTERCARE FOLLOWING LEFT KNEE JOINT REPLACEMENT SURGERY: ICD-10-CM

## 2025-05-15 DIAGNOSIS — Z96.652 AFTERCARE FOLLOWING LEFT KNEE JOINT REPLACEMENT SURGERY: ICD-10-CM

## 2025-05-15 PROCEDURE — 99211 OFF/OP EST MAY X REQ PHY/QHP: CPT | Performed by: PHYSICIAN ASSISTANT

## 2025-05-15 PROCEDURE — 73562 X-RAY EXAM OF KNEE 3: CPT | Mod: LEFT SIDE | Performed by: RADIOLOGY

## 2025-05-15 PROCEDURE — 73562 X-RAY EXAM OF KNEE 3: CPT | Mod: LT

## 2025-05-15 NOTE — PROGRESS NOTES
GALILEO Goyal, ROWENA, ATC  Adult Reconstruction and Joint Replacement Surgery  Phone: [phone]     Fax:[phone]            Chief Complaint   Patient presents with    Left Knee - Post-op       HPI:  Lydia Limon is a pleasant 79 y.o. year-old female here for follow-up of their side: left total knee arthroplasty by Dr. Kaur.  The patient is approximately 6 week(s) postop.The patient has no mechanical symptoms.  The patient has mild swelling and pain.   The patients wound has healed uneventfully.  The patient has been doing HEP and/or outpatient PT.  No complications postoperatively.      Review of Systems  Medical History[1]  Problem List[2]  Medication Documentation Review Audit       Reviewed by Eddy Laura PT (Physical Therapist) on 04/04/25 at 1217      Medication Order Taking? Sig Documenting Provider Last Dose Status   acetaminophen (Tylenol Extra Strength) 500 mg tablet [id number] Yes Take 2 tablets (1,000 mg) by mouth every 6 hours if needed for mild pain (1 - 3). Irene Goncalves PA-C  Active   ascorbic acid, vitamin C, (Sherrie-C) crystals [id number] Yes Take 1 tablet by mouth early in the morning.. Historical Provider, MD Past Week Active   aspirin 81 mg EC tablet [id number] Yes Take 1 tablet (81 mg) by mouth 2 times a day. Irene Goncalves PA-C  Active   atorvastatin (Lipitor) 10 mg tablet [id number] Yes TAKE 1 TABLET BY MOUTH EVERY DAY Cecil Chu MD 4/1/2025 Active   calcium carbonate-vitamin D3 (Caltrate 600 plus D) 600 mg-20 mcg (800 unit) tablet,chewable [id number] Yes Chew 2 tablets once daily. Historical Provider, MD Past Week Active   docusate sodium (Colace) 100 mg capsule [id number] Yes Take 1 capsule (100 mg) by mouth 2 times a day. Irene Goncalves PA-C  Active   meloxicam (Mobic) 15 mg tablet [id number] Yes Take 1 tablet (15 mg) by mouth once daily. Irene Gonclaves PA-C  Active   multivitamin tablet [id number] Yes Take 1 tablet by mouth once daily.  Historical Provider, MD Past Week Active   oxyCODONE (Roxicodone) 5 mg immediate release tablet [id number] Yes Take 1 tablet (5 mg) by mouth every 6 hours if needed for severe pain (7 - 10) for up to 7 days. Irene Goncalves PA-C  Active   pantoprazole (ProtoNix) 40 mg EC tablet [id number] Yes Take 1 tablet (40 mg) by mouth once daily. Do not crush, chew, or split. Irene Goncalves PA-C  Active   polyethylene glycol (Glycolax, Miralax) 17 gram packet [id number] Yes Mix 1 cap (17g) into 8 ounces of fluid and drink once daily Irene Goncalves PA-C  Active   traMADol (Ultram) 50 mg tablet [id number] Yes Take 1 tablet (50 mg) by mouth every 6 hours if needed for severe pain (7 - 10) for up to 7 days. Irene Goncalves PA-C  Active   vitamin E 180 mg (400 unit) capsule [id number] Yes Take 1 capsule (400 Units) by mouth once daily. Historical Provider, MD Past Week Active                  RX Allergies[3]  Social History     Socioeconomic History    Marital status:      Spouse name: Not on file    Number of children: Not on file    Years of education: Not on file    Highest education level: Not on file   Occupational History    Not on file   Tobacco Use    Smoking status: Never    Smokeless tobacco: Never   Vaping Use    Vaping status: Never Used   Substance and Sexual Activity    Alcohol use: Never    Drug use: Never    Sexual activity: Not on file   Other Topics Concern    Not on file   Social History Narrative    Not on file     Social Drivers of Health     Financial Resource Strain: Low Risk  (4/2/2025)    Overall Financial Resource Strain (CARDIA)     Difficulty of Paying Living Expenses: Not hard at all   Food Insecurity: No Food Insecurity (4/2/2025)    Hunger Vital Sign     Worried About Running Out of Food in the Last Year: Never true     Ran Out of Food in the Last Year: Never true   Transportation Needs: No Transportation Needs (4/23/2025)    OASIS : Transportation     Lack of Transportation (Medical): No      Lack of Transportation (Non-Medical): No     Patient Unable or Declines to Respond: No   Physical Activity: Not on file   Stress: Not on file   Social Connections: Feeling Socially Integrated (4/23/2025)    OASIS : Social Isolation     Frequency of experiencing loneliness or isolation: Never   Intimate Partner Violence: Not At Risk (4/2/2025)    Humiliation, Afraid, Rape, and Kick questionnaire     Fear of Current or Ex-Partner: No     Emotionally Abused: No     Physically Abused: No     Sexually Abused: No   Housing Stability: Low Risk  (4/2/2025)    Housing Stability Vital Sign     Unable to Pay for Housing in the Last Year: No     Number of Times Moved in the Last Year: 0     Homeless in the Last Year: No     Surgical History[4]    Physical Exam  side: left Knee  There were no vitals filed for this visit.  AxO x 3 in NAD.   Assistive Device: no device. Coordination and balance intact.  Normal bilateral upper and lower extremities.  No erythema, ecchymosis, temperature changes. No popliteal lymphadenopathy,  No overlying lesion  Mood: euthymic  Respirations non-labored  The incision is midline healing well with no signs of surrounding infection, dehiscence or drainage.   Neurovascular exam is at baseline.  No instability varus or valgus stressing the knee at 0, 30 or 60 degrees.  No instability in the AP plane at 90 degrees.  Range of motion: 0 degrees extension, 110 degrees flexion  5/5 hip flexion/knee extension/DF/PF/EHL  SILT in allison/saph/ per/tib distribution   Extremities warm and well perfused.  No lower extremity calf tenderness, warmth or swelling. Lower extremity well perfused  2+ Femoral/DP/PT pulses bilaterally    Imaging:  No images are attached to the encounter.    I personally reviewed multiple views of the knee today in clinic. Status post side: left Total Knee aArthroplasty. The implant is well fixed, well aligned.  No evidence of vioeltta-implant fracture, lucency or  dislocation.    Impression/Plan:  Lydia Limon is doing well post-operatively and happy with the results of the operation.     S/P side: left Total Knee Arthroplasty  I talked with patient at length about activity precautions and progression of activities. The patient understands their permanent precautions. At this time, you may gradually increase your activities and get back to a normal, low-impact lifestyle. Please avoid running, jumping, and heavy lifting.      3. Continue HEP or outpatient PT, per protocol.    4. Continue Post-operative instructions.    5. Discussed the importance of dental prophylactic dental antibiotics lifelong. Patient may request medication refill through MyChart,       Pharmacy or surgeons office.    All questions answered.    Follow-up 1 year with x-rays at next visit.    Irene Goncalves MPAS, PA-C, ATC  Orthopedic Physician Assisant  Adult Reconstruction and Total Joint Replacement  General Orthopedics  Department of Orthopaedic Surgery  [place] [id number]  noodls messaging preferred         [1]   Past Medical History:  Diagnosis Date    Arthritis     Cataract     Hearing aid worn     Rt side    HL (hearing loss)     Lt ear complete loss of hearing, RT ear w/ hearing aid    Hyperlipidemia     Metatarsalgia, unspecified foot     Metatarsalgia    Personal history of diseases of the skin and subcutaneous tissue     History of hyperkeratosis of skin   [2]   Patient Active Problem List  Diagnosis    Blepharitis of both eyes    Cardiomyopathy    Combined form of age-related cataract, left eye    Combined form of age-related cataract, right eye    Dry eyes, bilateral    Fatigue    Hearing loss    Hyperlipidemia    Hypercalcemia    Hyperopia with presbyopia    Left shoulder pain    Lumbar radiculitis    Metatarsalgia of both feet    Neck pain    Osteoporosis    PVC (premature ventricular contraction)    PVD  (posterior vitreous detachment), left eye    PVD (posterior vitreous detachment), right eye    S/P parathyroidectomy (CMS/HCC)    Trigger finger of left thumb    Valgus deformity of great toe    Lymphopenia    Primary osteoarthritis of left knee   [3] No Known Allergies  [4]   Past Surgical History:  Procedure Laterality Date    OTHER SURGICAL HISTORY Left 10/14/2013    Ear Surgery    OTHER SURGICAL HISTORY  02/18/2020    Parathyroidectomy    TUBAL LIGATION  02/14/2017    Tubal Ligation

## 2025-05-22 ENCOUNTER — EVALUATION (OUTPATIENT)
Dept: PHYSICAL THERAPY | Facility: CLINIC | Age: 80
End: 2025-05-22
Payer: MEDICARE

## 2025-05-22 DIAGNOSIS — M17.12 UNILATERAL PRIMARY OSTEOARTHRITIS, LEFT KNEE: Primary | ICD-10-CM

## 2025-05-22 PROCEDURE — 97161 PT EVAL LOW COMPLEX 20 MIN: CPT | Mod: GP

## 2025-05-22 ASSESSMENT — ENCOUNTER SYMPTOMS
DEPRESSION: 0
OCCASIONAL FEELINGS OF UNSTEADINESS: 0
LOSS OF SENSATION IN FEET: 0
PAIN LOCATION: L KNEE

## 2025-05-22 NOTE — PROGRESS NOTES
"Physical Therapy Evaluation and Treatment     Patient Name: Lydia Limon  MRN: [id number]  Encounter date: 5/22/2025  Time Calculation  Start Time: 1035  Stop Time: 1108  Time Calculation (min): 33 min  PT Evaluation Time Entry  PT Evaluation (Low) Time Entry: 33    Visit # 1 of 13  Visits/Dates Authorized: 1) MEDICARE A/B -  NO AUTH / $257 DED MET / 80% Coins / [place] visits / $93.88 used 2025 PT/ST.   2) MMO - NO AUTH / 100% COVERAGE thru 12/31/25 / $250 DED MET / reverify 1/1/26 - 80% COINS / $1500 OOP MET / 20V - 0 USED / Availity [phone] / ds 5/21/25 //   Dry Needling not covered.  Infrared Light - Req auth //           Current Problem:   Problem List Items Addressed This Visit    None  Visit Diagnoses         Codes      Unilateral primary osteoarthritis, left knee    -  Primary M17.12    Relevant Orders    Follow Up In Physical Therapy          Precautions:L TKR 4/2/25, osteoporosis, [place]          Subjective Evaluation    History of Present Illness  Date of surgery: 4/2/25.  Mechanism of injury: Pt is s/p L TKR on 4/2/25. She was struggling with knee pain for years. Pt had PT in the past and injections, but elected to have th TKR. Pt went through home PT and has been performing HEP twice a day. Uses SPC, but did not need one prior to surgery     Pain  Pain scale: \"stiff\"  Location: L knee    Social Support  Lives in: multiple-level home (with basement)  Lives with: spouse (2 cats)    Treatments  Previous treatment: physical therapy and home therapy  Patient Goals  Patient goal: walk better,            Objective     Active Range of Motion   Left Hip   Flexion: 92 degrees   Abduction: WFL    Right Hip   Flexion: 103 degrees   Abduction: WFL  Left Knee   Flexion: 84 degrees   Extension: Left knee active extension: 0-1.     Right Knee   Flexion: 109 degrees   Extension: 4 degrees     Passive Range of Motion   Left Knee   Flexion: Left knee passive flexion: pain limiting.     Strength/Myotome Testing     Left " Hip   Planes of Motion   Flexion: 5  Abduction: 4    Right Hip   Planes of Motion   Flexion: 5  Abduction: 5    Left Knee   Flexion: 2-  Extension: 4    Right Knee   Flexion: 5  Extension: 4+    Ambulation     Ambulation: Stairs     Additional Stairs Ambulation Details  Able to ascend with LLE, but pain   Did not attempt to descend with RLE    Comments   Reduced knee flexion L; SPC              Objective     Active Range of Motion   Left Hip   Flexion: 92 degrees   Abduction: WFL    Right Hip   Flexion: 103 degrees   Abduction: WFL  Left Knee   Flexion: 84 degrees   Extension: Left knee active extension: 0-1.     Right Knee   Flexion: 109 degrees   Extension: 4 degrees     Passive Range of Motion   Left Knee   Flexion: Left knee passive flexion: pain limiting.     Strength/Myotome Testing     Left Hip   Planes of Motion   Flexion: 5  Abduction: 4    Right Hip   Planes of Motion   Flexion: 5  Abduction: 5    Left Knee   Flexion: 2-  Extension: 4    Right Knee   Flexion: 5  Extension: 4+    Ambulation     Ambulation: Stairs     Additional Stairs Ambulation Details  Able to ascend with LLE, but pain   Did not attempt to descend with RLE    Comments   Reduced knee flexion L; SPC       Objective     Active Range of Motion   Left Hip   Flexion: 92 degrees   Abduction: WFL    Right Hip   Flexion: 103 degrees   Abduction: WFL  Left Knee   Flexion: 84 degrees   Extension: Left knee active extension: 0-1.     Right Knee   Flexion: 109 degrees   Extension: 4 degrees     Passive Range of Motion   Left Knee   Flexion: Left knee passive flexion: pain limiting.     Strength/Myotome Testing     Left Hip   Planes of Motion   Flexion: 5  Abduction: 4    Right Hip   Planes of Motion   Flexion: 5  Abduction: 5    Left Knee   Flexion: 2-  Extension: 4    Right Knee   Flexion: 5  Extension: 4+    Ambulation     Ambulation: Stairs     Additional Stairs Ambulation Details  Able to ascend with LLE, but pain   Did not attempt to descend with  RLE    Comments   Reduced knee flexion L; SPC              Other Measures  Lower Extremity Funtional Score (LEFS): 31/80    Treatments:       HEP / Access Codes:   Access Code: [email or web address]  URL: [email or web address]  Date: 05/22/2025  Prepared by: Bartolo Goncalves    Exercises  - Seated Quad Set  - 1-6 x daily - 7 x weekly - 1-2 sets - 10 reps  - Standing Knee Flexion Stretch on Step  - 1-6 x daily - 7 x weekly - 1-2 sets - 10 reps - 5seconds hold  - Sitting Heel Slide with Towel  - 1-6 x daily - 7 x weekly - 3 sets - 10 reps - 5seconds hold  - Mini Squat with Counter Support  - 1-2 x daily - 7 x weekly - 1-2 sets - 10 reps  - Standing Hip Abduction with Counter Support  - 1-2 x daily - 7 x weekly - 1-2 sets - 10 reps    Assessment & Plan     Assessment  Impairments: abnormal gait, abnormal or restricted ROM, impaired physical strength and pain with function  Assessment details: Pt is s/p L TKR on 4/2/25. She is limited in L knee flexion as above and has pain with it. Recommend progressing strengthening and stretching to improve L knee AROM. Progress gait and stairs as able   Prognosis: good    Goals  walk better,    Plan  Therapy options: will be seen for skilled physical therapy services  Planned modality interventions: cryotherapy and thermotherapy (paraffin bath)  Planned therapy interventions: balance/weight-bearing training, body mechanics training, flexibility, functional ROM exercises, gait training, home exercise program, therapeutic activities, stretching, strengthening, neuromuscular re-education and manual therapy  Frequency: 2x week  Duration in visits: 12  Duration in weeks: 6  Treatment plan discussed with: patient  Plan details: Pt educated on HEP-given handout, POC, assessment, importance of improving knee flexion        Low complexity due to patient's clinical presentation being stable and uncomplicated by any significant comorbidities that may affect rehab tolerance and progression.      Post-Treatment Symptoms:   No increase     Goals:   Active       PT Problem       PT Goal 1       Start:  05/22/25    Expected End:  06/12/25       Pt will be independent with HEP to ensure benefit outside of PT         PT Goal 2       Start:  05/22/25    Expected End:  07/03/25       Pt will improve R knee flexion to at least 110 degrees to allow pt to be able to perform ADLs    Pt will be able to negotiate stairs reciprocally and independently to allow pt to be able to perform ADLs    Pt will improve strength of L knee and hip to at least 4+/5 to allow pt to be able to perform ADLs    Pt will improve score on LEFS by at least 10 points to allow pt to be able to perform ADLs    Pt will continue to report minimal to no pain to allow pt to be able to perform ADLs

## 2025-06-02 ENCOUNTER — TREATMENT (OUTPATIENT)
Dept: PHYSICAL THERAPY | Facility: CLINIC | Age: 80
End: 2025-06-02
Payer: MEDICARE

## 2025-06-02 DIAGNOSIS — M17.12 UNILATERAL PRIMARY OSTEOARTHRITIS, LEFT KNEE: ICD-10-CM

## 2025-06-02 PROCEDURE — 97110 THERAPEUTIC EXERCISES: CPT | Mod: GP,CQ

## 2025-06-02 ASSESSMENT — PAIN DESCRIPTION - DESCRIPTORS: DESCRIPTORS: TIGHTNESS

## 2025-06-02 ASSESSMENT — PAIN SCALES - GENERAL: PAINLEVEL_OUTOF10: 0 - NO PAIN

## 2025-06-02 NOTE — PROGRESS NOTES
Physical Therapy Treatment    Patient Name: Lydia Limon  MRN: [id number]  Today's Date: 6/2/2025  Time Calculation  Start Time: 1145  Stop Time: 1230  Time Calculation (min): 45 min  PT Therapeutic Procedures Time Entry  Therapeutic Exercise Time Entry: 44    Insurance:  Visit number: 2 of 13  Authorization info: 1) MEDICARE A/B -  NO AUTH / $257 DED MET / 80% Coins / [place] visits / $93.88 used 2025 PT/ST.   2) MMO - NO AUTH / 100% COVERAGE thru 12/31/25 / $250 DED MET / reverify 1/1/26 - 80% COINS / $1500 OOP MET / 20V - 0 USED / Availity [phone] / ds 5/21/25 //   Dry Needling not covered.  Infrared Light - Req auth //    Insurance Type: Payor: MEDICARE / Plan: MEDICARE PART A AND B / Product Type: *No Product type* /     Current Problem   1. Unilateral primary osteoarthritis, left knee  Follow Up In Physical Therapy          Subjective   General   Reason for Referral: Unilateral primary osteoarthritis, left knee    -  Primary M17.12     Relevant Orders    Follow Up In Physical Therapy  Referred By: Irene Goncalves PA-C  General Comment: Pt reports tightness in L knee persists.  Precautions:  Precautions  Post-Surgical Precautions: Left total knee precautions  Precautions Comment: L TKA performed 4/2/25, osteoporosis, [place]  Pain   0-10 (Numeric) Pain Score: 0 - No pain  Pain Location: Knee  Pain Orientation: Left  Pain Descriptors: Tightness  Post Treatment Pain Level 0/10    Objective   L knee AROM 2-96 degrees after treatment.    Treatments:  Therapeutic Exercise:  Therapeutic Exercise  Therapeutic Exercise Performed: Yes  Therapeutic Exercise Activity 1: Nustep L5, 6'  Therapeutic Exercise Activity 2: Standing PF 2x10  Therapeutic Exercise Activity 3: Standing DF 2x10  Therapeutic Exercise Activity 4: Sidestep L3 band above knees 20'x2 L/R each  Therapeutic Exercise Activity 5: Zig-Zag with L3 band above knees 20'x2 F/B each  Therapeutic Exercise Activity 6: Sit to stand 2x10 from chair  Therapeutic  "Exercise Activity 7: Step ups 6\" step 2x10  Therapeutic Exercise Activity 8: Lateral step ups 6\" step 2x10  Therapeutic Exercise Activity 9: Seated hamstring stretches 20\"x3  Therapeutic Exercise Activity 10: Quad sets 5\" hold 2x10  Therapeutic Exercise Activity 11: SLRs 2x10  Therapeutic Exercise Activity 12: Heel slides 5\" hold 2x10  Therapeutic Exercise Activity 13: Heel slides with OP 20\" hold x10     Assessment   Assessment:   PT Assessment  Rehab Prognosis: Good  Evaluation/Treatment Tolerance: Patient tolerated treatment well  Assessment Comment: Pt demonstrates improving L knee AROM, tolerates ther ex with transient increase in pain S/S.  Improved sit to stand transfers, did not require UEs after initial trials.    Plan:   OP PT Plan  Treatment/Interventions: Other (comment) (Continue to progress L LE strengthening and L knee AROM activites as tolerated.)  PT Plan: Skilled PT  Rehab Potential: Good    OP EDUCATION:  Outpatient Education  Individual(s) Educated: Patient  Education Provided: Anatomy, Body Mechanics, Post-Op Precautions  Patient/Caregiver Demonstrated Understanding: yes  Patient Response to Education: Patient/Caregiver Verbalized Understanding of Information, Patient/Caregiver Performed Return Demonstration of Exercises/Activities, Patient/Caregiver Asked Appropriate Questions    Goals:   Active       PT Problem       PT Goal 1       Start:  05/22/25    Expected End:  06/12/25       Pt will be independent with HEP to ensure benefit outside of PT         PT Goal 2       Start:  05/22/25    Expected End:  07/03/25       Pt will improve R knee flexion to at least 110 degrees to allow pt to be able to perform ADLs    Pt will be able to negotiate stairs reciprocally and independently to allow pt to be able to perform ADLs    Pt will improve strength of L knee and hip to at least 4+/5 to allow pt to be able to perform ADLs    Pt will improve score on LEFS by at least 10 points to allow pt to be able " to perform ADLs    Pt will continue to report minimal to no pain to allow pt to be able to perform ADLs

## 2025-06-06 ENCOUNTER — TREATMENT (OUTPATIENT)
Dept: PHYSICAL THERAPY | Facility: CLINIC | Age: 80
End: 2025-06-06
Payer: MEDICARE

## 2025-06-06 DIAGNOSIS — M17.12 UNILATERAL PRIMARY OSTEOARTHRITIS, LEFT KNEE: ICD-10-CM

## 2025-06-06 PROCEDURE — 97110 THERAPEUTIC EXERCISES: CPT | Mod: GP,CQ

## 2025-06-06 ASSESSMENT — PAIN SCALES - GENERAL: PAINLEVEL_OUTOF10: 0 - NO PAIN

## 2025-06-06 NOTE — PROGRESS NOTES
"Physical Therapy Treatment    Patient Name: Lydia Limon  MRN: [id number]  Today's Date: 6/6/2025  Time Calculation  Start Time: 1345  Stop Time: 1430  Time Calculation (min): 45 min  PT Therapeutic Procedures Time Entry  Therapeutic Exercise Time Entry: 42    Insurance:  Visit number: 3 of 13  Authorization info: 1) MEDICARE A/B -  NO AUTH / $257 DED MET / 80% Coins / [place] visits / $93.88 used 2025 PT/ST.   2) MMO - NO AUTH / 100% COVERAGE thru 12/31/25 / $250 DED MET / reverify 1/1/26 - 80% COINS / $1500 OOP MET / 20V - 0 USED / Availity [phone] / ds 5/21/25 //   Dry Needling not covered.  Infrared Light - Req auth //  Insurance Type: Payor: MEDICARE / Plan: MEDICARE PART A AND B / Product Type: *No Product type* /     Current Problem   1. Unilateral primary osteoarthritis, left knee  Follow Up In Physical Therapy          Subjective   General   Reason for Referral: Unilateral primary osteoarthritis, left knee    -  Primary M17.12     Relevant Orders    Follow Up In Physical Therapy  Referred By: Irene Goncalves PA-C  General Comment: Pt reports pain in L knee last night, feels good today.  Precautions:  Precautions  Post-Surgical Precautions: Left total knee precautions  Precautions Comment: L TKA performed 4/2/25, osteoporosis, [place]  Pain   0-10 (Numeric) Pain Score: 0 - No pain  Post Treatment Pain Level 1/10    Objective   AROM L knee 0-98 degrees.    Treatments:  Therapeutic Exercise:  Therapeutic Exercise  Therapeutic Exercise Performed: Yes  Therapeutic Exercise Activity 1: Nustep L5, 6'  Therapeutic Exercise Activity 2: Standing PF 2x10  Therapeutic Exercise Activity 3: Standing DF 2x10  Therapeutic Exercise Activity 4: Sidestep L5 band above knees 20'x2 L/R each  Therapeutic Exercise Activity 5: Zig-Zag with L5 band above knees 20'x2 F/B each  Therapeutic Exercise Activity 6: Sit to stand 2x10 from chair  Therapeutic Exercise Activity 7: Step ups 6\" step 2x10  Therapeutic Exercise Activity 8: " "Lateral step ups 6\" step 2x10  Therapeutic Exercise Activity 9: Seated hamstring stretches 20\"x3  Therapeutic Exercise Activity 10: Quad sets 5\" hold 2x10  Therapeutic Exercise Activity 12: Heel slides 5\" hold 2x10  Therapeutic Exercise Activity 13: Heel slides with OP 20\" hold x10       Assessment   Assessment:   PT Assessment  Rehab Prognosis: Good  Evaluation/Treatment Tolerance: Patient tolerated treatment well  Assessment Comment: Pt demonstrates improving AROM at L knee this visit, tolerates ADLs with mild occasional S/S.    Plan:   OP PT Plan  Treatment/Interventions: Other (comment) (Continue to progress L LE strengthening and L knee AROM activites as tolerated.)  PT Plan: Skilled PT  Rehab Potential: Good    OP EDUCATION:  Outpatient Education  Individual(s) Educated: Patient  Education Provided: Anatomy, Body Mechanics  Patient/Caregiver Demonstrated Understanding: yes  Patient Response to Education: Patient/Caregiver Verbalized Understanding of Information, Patient/Caregiver Performed Return Demonstration of Exercises/Activities, Patient/Caregiver Asked Appropriate Questions    Goals:   Active       PT Problem       PT Goal 1       Start:  05/22/25    Expected End:  06/12/25       Pt will be independent with HEP to ensure benefit outside of PT         PT Goal 2       Start:  05/22/25    Expected End:  07/03/25       Pt will improve R knee flexion to at least 110 degrees to allow pt to be able to perform ADLs    Pt will be able to negotiate stairs reciprocally and independently to allow pt to be able to perform ADLs    Pt will improve strength of L knee and hip to at least 4+/5 to allow pt to be able to perform ADLs    Pt will improve score on LEFS by at least 10 points to allow pt to be able to perform ADLs    Pt will continue to report minimal to no pain to allow pt to be able to perform ADLs                "

## 2025-06-09 ENCOUNTER — TREATMENT (OUTPATIENT)
Dept: PHYSICAL THERAPY | Facility: CLINIC | Age: 80
End: 2025-06-09
Payer: MEDICARE

## 2025-06-09 DIAGNOSIS — M17.12 UNILATERAL PRIMARY OSTEOARTHRITIS, LEFT KNEE: ICD-10-CM

## 2025-06-09 PROCEDURE — 97112 NEUROMUSCULAR REEDUCATION: CPT | Mod: GP,CQ

## 2025-06-09 PROCEDURE — 97110 THERAPEUTIC EXERCISES: CPT | Mod: GP,CQ

## 2025-06-09 ASSESSMENT — PAIN SCALES - GENERAL: PAINLEVEL_OUTOF10: 0 - NO PAIN

## 2025-06-09 NOTE — PROGRESS NOTES
"Physical Therapy Treatment    Patient Name: Lydia Limon  MRN: [id number]  Today's Date: 6/9/2025  Time Calculation  Start Time: 0932  Stop Time: 1017  Time Calculation (min): 45 min  PT Therapeutic Procedures Time Entry  Neuromuscular Re-Education Time Entry: 8  Therapeutic Exercise Time Entry: 35    Insurance:  Visit number: 4 of 13  Authorization info: 1) MEDICARE A/B -  NO AUTH / $257 DED MET / 80% Coins / [place] visits / $93.88 used 2025 PT/ST.   2) MMO - NO AUTH / 100% COVERAGE thru 12/31/25 / $250 DED MET / reverify 1/1/26 - 80% COINS / $1500 OOP MET / 20V - 0 USED / Availity [phone] / ds 5/21/25 //   Dry Needling not covered.  Infrared Light - Req auth //    Insurance Type: Payor: MEDICARE / Plan: MEDICARE PART A AND B / Product Type: *No Product type* /     Current Problem   1. Unilateral primary osteoarthritis, left knee  Follow Up In Physical Therapy          Subjective   General   Reason for Referral: Unilateral primary osteoarthritis, left knee    -  Primary M17.12     Relevant Orders    Follow Up In Physical Therapy  Referred By: Irene Goncalves PA-C  General Comment: Pt reports no pain on arrival.  Precautions:  Precautions  Post-Surgical Precautions: Left total knee precautions  Precautions Comment: L TKA performed 4/2/25, osteoporosis, [place]  Pain   0-10 (Numeric) Pain Score: 0 - No pain  Post Treatment Pain Level 1/10    Objective   Pt requires occasional cueing to allow L knee to flex during swing phase, AROM L knee after ther ex 0-101 degrees.    Treatments:  Therapeutic Exercise:  Therapeutic Exercise  Therapeutic Exercise Performed: Yes  Therapeutic Exercise Activity 1: Nustep L5, 6'  Therapeutic Exercise Activity 4: Sidestep L5 band above knees 20'x2 L/R each  Therapeutic Exercise Activity 5: Zig-Zag with L5 band above knees 20'x2 F/B each  Therapeutic Exercise Activity 6: Sit to stand 2x10 from chair  Therapeutic Exercise Activity 7: Step ups 6\" step 2x10  Therapeutic Exercise Activity 8: " "Lateral step ups 6\" step 2x10  Therapeutic Exercise Activity 9: Seated hamstring stretches 20\"x3  Therapeutic Exercise Activity 10: Quad sets 5\" hold 2x10  Therapeutic Exercise Activity 12: Heel slides 5\" hold 2x10  Therapeutic Exercise Activity 13: Heel slides with OP 20\" hold x10  Therapeutic activity:     Neuro Re-ed:   Balance/Neuromuscular Re-Education  Balance/Neuromuscular Re-Education Activity Performed: Yes  Balance/Neuromuscular Re-Education Activity 1: NBOS on foam 2'  Balance/Neuromuscular Re-Education Activity 2: MIP on foam 2'  Balance/Neuromuscular Re-Education Activity 3: Step tap to 6\" step 2'  Balance/Neuromuscular Re-Education Activity 4: FWD lunges x15 L/R each       Assessment   Assessment:   PT Assessment  Rehab Prognosis: Good  Evaluation/Treatment Tolerance: Patient tolerated treatment well  Assessment Comment: Pt demonstrates continued improvement in L knee AROM this visit, tolerates treatment with mild exacerbation of pain S/S, improving gait mechanics observed.    Plan:   OP PT Plan  Treatment/Interventions: Other (comment) (Continue to progress L LE strengthening and L knee AROM activites as tolerated.)  PT Plan: Skilled PT  Rehab Potential: Good    OP EDUCATION:  Outpatient Education  Individual(s) Educated: Patient  Education Provided: Body Mechanics, Anatomy  Patient/Caregiver Demonstrated Understanding: yes  Patient Response to Education: Patient/Caregiver Verbalized Understanding of Information, Patient/Caregiver Performed Return Demonstration of Exercises/Activities, Patient/Caregiver Asked Appropriate Questions    Goals:   Active       PT Problem       PT Goal 1       Start:  05/22/25    Expected End:  06/12/25       Pt will be independent with HEP to ensure benefit outside of PT         PT Goal 2       Start:  05/22/25    Expected End:  07/03/25       Pt will improve R knee flexion to at least 110 degrees to allow pt to be able to perform ADLs    Pt will be able to negotiate " stairs reciprocally and independently to allow pt to be able to perform ADLs    Pt will improve strength of L knee and hip to at least 4+/5 to allow pt to be able to perform ADLs    Pt will improve score on LEFS by at least 10 points to allow pt to be able to perform ADLs    Pt will continue to report minimal to no pain to allow pt to be able to perform ADLs

## 2025-06-11 ENCOUNTER — TREATMENT (OUTPATIENT)
Dept: PHYSICAL THERAPY | Facility: CLINIC | Age: 80
End: 2025-06-11
Payer: MEDICARE

## 2025-06-11 DIAGNOSIS — M17.12 UNILATERAL PRIMARY OSTEOARTHRITIS, LEFT KNEE: ICD-10-CM

## 2025-06-11 PROCEDURE — 97110 THERAPEUTIC EXERCISES: CPT | Mod: GP

## 2025-06-11 NOTE — PROGRESS NOTES
"Physical Therapy Treatment    Patient Name: Lydia Limon  MRN: [id number]  Today's Date: 6/11/2025  Time Calculation  Start Time: 0908  Stop Time: 0950  Time Calculation (min): 42 min  PT Therapeutic Procedures Time Entry  Therapeutic Exercise Time Entry: 42    Insurance:  Visit number: 5 of 13  Authorization info: 1) MEDICARE A/B -  NO AUTH / $257 DED MET / 80% Coins / [place] visits / $93.88 used 2025 PT/ST.   2) MMO - NO AUTH / 100% COVERAGE thru 12/31/25 / $250 DED MET / reverify 1/1/26 - 80% COINS / $1500 OOP MET / 20V - 0 USED / Availity [phone] / ds 5/21/25 //   Dry Needling not covered.  Infrared Light - Req auth //         Insurance Type: Payor: MEDICARE / Plan: MEDICARE PART A AND B / Product Type: *No Product type* /     Current Problem   1. Unilateral primary osteoarthritis, left knee  Follow Up In Physical Therapy          Subjective   General    Pt reported that she has been stretching at home. She still has trouble focusing on walking correctly.   Precautions:L TKR 4/2/25, osteoporosis, [place]      Pain    \"Alright\"  Post Treatment Pain Level no reported increase     Objective   R knee flexion: 106 on step    Treatments:  Therapeutic Exercise:   Nustep lvl3 5mins   LAQs: x10 3lbs; x15 B 26lbs; 2x15 B 30lbs   Stairs: x4 reciprocal stairs in clinic   Heel taps: x15 fwd/lateral 2in L CKC  STS: x10; 2x10 with step under foot  Knee flexion on step: x10  Side lying hip abduction: 2x15  Review of HEP  Assessment   Assessment:    Pt demonstrated knee valgus B with descending stairs. Is able to negotiate stairs reciprocally, but is hesitant. Knee flexion continues to improve     Plan:    Continue to progress as able     OP EDUCATION:   Pt educated on technique, HEP-given handout, stairs   Access Code: [email or web address]  URL: [email or web address]  Date: 06/11/2025  Prepared by: Bartolo Goncalves    Exercises  - Seated Quad Set  - 1-6 x daily - 7 x weekly - 1-2 sets - 10 reps  - Standing Knee Flexion Stretch on " Step  - 1-6 x daily - 7 x weekly - 1-2 sets - 10 reps - 5seconds hold  - Sitting Heel Slide with Towel  - 1-6 x daily - 7 x weekly - 3 sets - 10 reps - 5seconds hold  - Seated Knee Extension with Resistance  - 1 x daily - 7 x weekly - 1-2 sets - 10 reps  - Sidelying Hip Abduction  - 1 x daily - 7 x weekly - 1-2 sets - 10 reps  - Sit to Stand with Arms Crossed  - 1 x daily - 7 x weekly - 1-2 sets - 10 reps  - Active Straight Leg Raise with Quad Set  - 1 x daily - 7 x weekly - 1-2 sets - 10 reps  Goals:   Active       PT Problem       PT Goal 1       Start:  05/22/25    Expected End:  06/12/25       Pt will be independent with HEP to ensure benefit outside of PT         PT Goal 2       Start:  05/22/25    Expected End:  07/03/25       Pt will improve R knee flexion to at least 110 degrees to allow pt to be able to perform ADLs    Pt will be able to negotiate stairs reciprocally and independently to allow pt to be able to perform ADLs    Pt will improve strength of L knee and hip to at least 4+/5 to allow pt to be able to perform ADLs    Pt will improve score on LEFS by at least 10 points to allow pt to be able to perform ADLs    Pt will continue to report minimal to no pain to allow pt to be able to perform ADLs

## 2025-06-16 ENCOUNTER — TREATMENT (OUTPATIENT)
Dept: PHYSICAL THERAPY | Facility: CLINIC | Age: 80
End: 2025-06-16
Payer: MEDICARE

## 2025-06-16 DIAGNOSIS — M17.12 UNILATERAL PRIMARY OSTEOARTHRITIS, LEFT KNEE: ICD-10-CM

## 2025-06-16 PROCEDURE — 97110 THERAPEUTIC EXERCISES: CPT | Mod: GP,CQ

## 2025-06-16 ASSESSMENT — PAIN SCALES - GENERAL: PAINLEVEL_OUTOF10: 0 - NO PAIN

## 2025-06-16 NOTE — PROGRESS NOTES
"Physical Therapy Treatment    Patient Name: Lydia Limon  MRN: [id number]  Today's Date: 6/16/2025  Time Calculation  Start Time: 0930  Stop Time: 1015  Time Calculation (min): 45 min  PT Therapeutic Procedures Time Entry  Therapeutic Exercise Time Entry: 44    Insurance:  Visit number: 6 of 13  Authorization info: 1) MEDICARE A/B -  NO AUTH / $257 DED MET / 80% Coins / [place] visits / $93.88 used 2025 PT/ST.   2) MMO - NO AUTH / 100% COVERAGE thru 12/31/25 / $250 DED MET / reverify 1/1/26 - 80% COINS / $1500 OOP MET / 20V - 0 USED / Availity [phone] / ds 5/21/25 //   Dry Needling not covered.  Infrared Light - Req auth //  Insurance Type: Payor: MEDICARE / Plan: MEDICARE PART A AND B / Product Type: *No Product type* /     Current Problem   1. Unilateral primary osteoarthritis, left knee  Follow Up In Physical Therapy          Subjective   General   Reason for Referral: Unilateral primary osteoarthritis, left knee    -  Primary M17.12     Relevant Orders    Follow Up In Physical Therapy  Referred By: Irene Goncalves PA-C  General Comment: Pt reports no pian on arrival. states tightness peresists when resting in one position too long.  Precautions:  Precautions  Post-Surgical Precautions: Left total knee precautions  Precautions Comment: L TKA performed 4/2/25, osteoporosis, [place]  Pain   0-10 (Numeric) Pain Score: 0 - No pain  Post Treatment Pain Level 1/10    Objective   AROM L knee 0-108 after ther ex.    Treatments:  Therapeutic Exercise:  Therapeutic Exercise  Therapeutic Exercise Performed: Yes  Therapeutic Exercise Activity 1: Nustep L5, 6'  Therapeutic Exercise Activity 2: Sidestep L5 band above knees 20'x2 L/R each  Therapeutic Exercise Activity 3: Sidestep L5 band above knees 20'x2 L/R each  Therapeutic Exercise Activity 4: Sit to stand 2x10 from chair  Therapeutic Exercise Activity 5: Anterior step down 4\" step 2x10  Therapeutic Exercise Activity 6: Lateral step ups 6\" step 2x10  Therapeutic Exercise " "Activity 7: Lateral step ups 6\" step 2x10  Therapeutic Exercise Activity 8: Knee flexion stretch 30\"x4  Therapeutic Exercise Activity 9: Seated hamstring stretches 20\"x3  Therapeutic Exercise Activity 10: Quad sets 5\" hold 2x10  Therapeutic Exercise Activity 11: SLRs 2# 2x10  Therapeutic Exercise Activity 12: Heel slides 5\" hold 2x10  Therapeutic Exercise Activity 13: Heel slides with OP 20\" hold x10     Assessment   Assessment:   PT Assessment  Rehab Prognosis: Good  Evaluation/Treatment Tolerance: Patient tolerated treatment well  Assessment Comment: Pt toleratres ther ex with mild increase in pain S/S, demonstrates improved L knee AROM as noted.    Plan:   OP PT Plan  Treatment/Interventions: Other (comment) (Continue to progress L LE strengthening and L knee AROM activites as tolerated.)  PT Plan: Skilled PT  Rehab Potential: Good    OP EDUCATION:  Outpatient Education  Individual(s) Educated: Patient  Education Provided: Anatomy, Body Mechanics  Patient/Caregiver Demonstrated Understanding: yes  Patient Response to Education: Patient/Caregiver Verbalized Understanding of Information, Patient/Caregiver Performed Return Demonstration of Exercises/Activities, Patient/Caregiver Asked Appropriate Questions    Goals:   Active       PT Problem       PT Goal 1       Start:  05/22/25    Expected End:  06/12/25       Pt will be independent with HEP to ensure benefit outside of PT         PT Goal 2       Start:  05/22/25    Expected End:  07/03/25       Pt will improve R knee flexion to at least 110 degrees to allow pt to be able to perform ADLs    Pt will be able to negotiate stairs reciprocally and independently to allow pt to be able to perform ADLs    Pt will improve strength of L knee and hip to at least 4+/5 to allow pt to be able to perform ADLs    Pt will improve score on LEFS by at least 10 points to allow pt to be able to perform ADLs    Pt will continue to report minimal to no pain to allow pt to be able to " perform ADLs

## 2025-06-18 ENCOUNTER — TREATMENT (OUTPATIENT)
Dept: PHYSICAL THERAPY | Facility: CLINIC | Age: 80
End: 2025-06-18
Payer: MEDICARE

## 2025-06-18 DIAGNOSIS — M17.12 UNILATERAL PRIMARY OSTEOARTHRITIS, LEFT KNEE: ICD-10-CM

## 2025-06-18 PROCEDURE — 97110 THERAPEUTIC EXERCISES: CPT | Mod: GP,CQ

## 2025-06-18 ASSESSMENT — PAIN SCALES - GENERAL: PAINLEVEL_OUTOF10: 0 - NO PAIN

## 2025-06-18 NOTE — PROGRESS NOTES
"Physical Therapy Treatment    Patient Name: Lydia Limon  MRN: [id number]  Today's Date: 6/18/2025  Time Calculation  Start Time: 1016  Stop Time: 1102  Time Calculation (min): 46 min  PT Therapeutic Procedures Time Entry  Therapeutic Exercise Time Entry: 42    Insurance:  Visit number: 7 of 13  Authorization info: 1) MEDICARE A/B -  NO AUTH / $257 DED MET / 80% Coins / [place] visits / $93.88 used 2025 PT/ST.   2) MMO - NO AUTH / 100% COVERAGE thru 12/31/25 / $250 DED MET / reverify 1/1/26 - 80% COINS / $1500 OOP MET / 20V - 0 USED / Availity [phone] / ds 5/21/25 //   Dry Needling not covered.  Infrared Light - Req auth //    Insurance Type: Payor: MEDICARE / Plan: MEDICARE PART A AND B / Product Type: *No Product type* /     Current Problem   1. Unilateral primary osteoarthritis, left knee  Follow Up In Physical Therapy          Subjective   General   Reason for Referral: Unilateral primary osteoarthritis, left knee    -  Primary M17.12     Relevant Orders    Follow Up In Physical Therapy  Referred By: Irene Goncalves PA-C  General Comment: Pt reports no pain in L knee on arrival.  Precautions:  Precautions  Post-Surgical Precautions: Left total knee precautions  Precautions Comment: L TKA performed 4/2/25, osteoporosis, [place]  Pain   0-10 (Numeric) Pain Score: 0 - No pain  Post Treatment Pain Level 1/10    Objective   AROM after treatment 0-108    Treatments:  Therapeutic Exercise:  Therapeutic Exercise  Therapeutic Exercise Performed: Yes  Therapeutic Exercise Activity 1: Nustep L5, 6'  Therapeutic Exercise Activity 2: Sidestep L5 band above knees 20'x2 L/R each  Therapeutic Exercise Activity 3: Sidestep L5 band above knees 20'x2 L/R each  Therapeutic Exercise Activity 4: Anterior step down 4\" step 2x10  Therapeutic Exercise Activity 5: Step ups 6\" step 2x10  Therapeutic Exercise Activity 6: Lateral step ups 6\" step 2x10  Therapeutic Exercise Activity 7: Sit to stand 2x10 from chair  Therapeutic Exercise " "Activity 8: Quad sets 5\" hold 2x10  Therapeutic Exercise Activity 9: SLRs 2# 2x10  Therapeutic Exercise Activity 10: Contract/Relax for L knee flexion x5  Therapeutic Exercise Activity 11: SLRs 2# 2x10  Therapeutic Exercise Activity 12: Heel slides with OP 20\" hold x10     Assessment   Assessment:   PT Assessment  Rehab Prognosis: Good  Evaluation/Treatment Tolerance: Patient tolerated treatment well  Assessment Comment: Pt demonstrates continued L knee AROM improvement as noted, improving gait mechanics observed.    Plan:   OP PT Plan  Treatment/Interventions: Other (comment) (Continue to progress L LE strengthening and L knee AROM activites as tolerated.)  PT Plan: Skilled PT  Rehab Potential: Good    OP EDUCATION:  Outpatient Education  Individual(s) Educated: Patient  Education Provided: Body Mechanics, Anatomy  Patient/Caregiver Demonstrated Understanding: yes  Patient Response to Education: Patient/Caregiver Verbalized Understanding of Information, Patient/Caregiver Performed Return Demonstration of Exercises/Activities, Patient/Caregiver Asked Appropriate Questions    Goals:   Active       PT Problem       PT Goal 1       Start:  05/22/25    Expected End:  06/12/25       Pt will be independent with HEP to ensure benefit outside of PT         PT Goal 2       Start:  05/22/25    Expected End:  07/03/25       Pt will improve R knee flexion to at least 110 degrees to allow pt to be able to perform ADLs    Pt will be able to negotiate stairs reciprocally and independently to allow pt to be able to perform ADLs    Pt will improve strength of L knee and hip to at least 4+/5 to allow pt to be able to perform ADLs    Pt will improve score on LEFS by at least 10 points to allow pt to be able to perform ADLs    Pt will continue to report minimal to no pain to allow pt to be able to perform ADLs                 "

## 2025-06-23 ENCOUNTER — TREATMENT (OUTPATIENT)
Dept: PHYSICAL THERAPY | Facility: CLINIC | Age: 80
End: 2025-06-23
Payer: MEDICARE

## 2025-06-23 DIAGNOSIS — M17.12 UNILATERAL PRIMARY OSTEOARTHRITIS, LEFT KNEE: ICD-10-CM

## 2025-06-23 DIAGNOSIS — Z96.652 AFTERCARE FOLLOWING LEFT KNEE JOINT REPLACEMENT SURGERY: Primary | ICD-10-CM

## 2025-06-23 DIAGNOSIS — Z47.1 AFTERCARE FOLLOWING LEFT KNEE JOINT REPLACEMENT SURGERY: Primary | ICD-10-CM

## 2025-06-23 PROCEDURE — 97110 THERAPEUTIC EXERCISES: CPT | Mod: GP | Performed by: PHYSICAL THERAPIST

## 2025-06-23 RX ORDER — AMOXICILLIN 500 MG/1
2000 CAPSULE ORAL ONCE AS NEEDED
Qty: 4 CAPSULE | Refills: 1 | Status: SHIPPED | OUTPATIENT
Start: 2025-06-23

## 2025-06-23 ASSESSMENT — PAIN - FUNCTIONAL ASSESSMENT: PAIN_FUNCTIONAL_ASSESSMENT: 0-10

## 2025-06-23 ASSESSMENT — PAIN DESCRIPTION - DESCRIPTORS: DESCRIPTORS: TIGHTNESS

## 2025-06-23 ASSESSMENT — PAIN SCALES - GENERAL: PAINLEVEL_OUTOF10: 4

## 2025-06-23 NOTE — PROGRESS NOTES
Physical Therapy Treatment    Patient Name: Lydia Limon  MRN: [id number]  Today's Date: 6/23/2025  Time Calculation  Start Time: 1145  Stop Time: 1215  Time Calculation (min): 30 min  PT Therapeutic Procedures Time Entry  Therapeutic Exercise Time Entry: 30    Insurance:  Visit number: 8 of 13  Authorization info: 1) MEDICARE A/B -  NO AUTH / $257 DED MET / 80% Coins / [place] visits / $93.88 used 2025 PT/ST.   2) MMO - NO AUTH / 100% COVERAGE thru 12/31/25 / $250 DED MET / reverify 1/1/26 - 80% COINS / $1500 OOP MET / 20V - 0 USED / Availity [phone] / ds 5/21/25 //   Dry Needling not covered.  Infrared Light - Req auth //  Insurance Type: Payor: MEDICARE / Plan: MEDICARE PART A AND B / Product Type: *No Product type* /     Current Problem   1. Unilateral primary osteoarthritis, left knee  Follow Up In Physical Therapy          Subjective   General   Reason for Referral: Unilateral primary osteoarthritis, left knee    -  Primary M17.12     Relevant Orders    Follow Up In Physical Therapy  Referred By: Irene Goncalves PA-C  General Comment: Pt states that she is doing her exercises when she can.  Precautions:  Precautions  LE Weight Bearing Status: Weight Bearing as Tolerated  Medical Precautions: Fall precautions  Post-Surgical Precautions: Left total knee precautions  Precautions Comment: L TKA performed 4/2/25, osteoporosis, [place]  Pain   Pain Assessment: 0-10  0-10 (Numeric) Pain Score: 4  Pain Type: Surgical pain  Pain Location: Knee  Pain Orientation: Left  Pain Descriptors: Tightness  Pain Frequency: Constant/continuous  Post Treatment Pain Level did  not quantify    Objective   Pt ambulating with antalgic gait pattern with L knee stiff lacking heel to toe gait pattern    Treatments:  Therapeutic Exercise:  Therapeutic Exercise  Therapeutic Exercise Performed: Yes  Therapeutic Exercise Activity 1: Nustep L5, 6' with discussion of current status  Therapeutic Exercise Activity 2: HC stretch incline x3 20 ct  "hold  Therapeutic Exercise Activity 3: knee flexion stretch x3 20 ct hold  Therapeutic Exercise Activity 4: air ex alt tops on 6\" step x20  Therapeutic Exercise Activity 5: airex step up onto6\" step fowards R and L x10  Therapeutic Exercise Activity 6: airex step up onto6\" step R and L lateral x10  Therapeutic Exercise Activity 7: step up onto airex x10 forwards x10  Therapeutic Exercise Activity 8: step up onto airex x10 lateral R and x10  Therapeutic Exercise Activity 9: SLS on airex x2 15 ct hold R and L  Therapeutic Exercise Activity 10: SLS on floor x15 R and L  Therapeutic Exercise Activity 11: standing with red ball in front of L knee with 2# 2 x10 3 ct hold      Assessment   Assessment:   PT Assessment  Assessment Comment: Pt tolerated exercises well.  Pt challenged with SLS.  Pt compliant with HEP.  Pt required verbal cues for heel to toe gait    Plan:   OP PT Plan  Duration: Continue with progress of strengthening, AROM, and balance.    OP EDUCATION:  Outpatient Education  Individual(s) Educated: Patient  Education Provided: Home Exercise Program  Patient/Caregiver Demonstrated Understanding: yes  Patient Response to Education: Patient/Caregiver Verbalized Understanding of Information, Patient/Caregiver Performed Return Demonstration of Exercises/Activities    Goals:   Active       PT Problem       PT Goal 1       Start:  05/22/25    Expected End:  06/12/25       Pt will be independent with HEP to ensure benefit outside of PT         PT Goal 2       Start:  05/22/25    Expected End:  07/03/25       Pt will improve R knee flexion to at least 110 degrees to allow pt to be able to perform ADLs    Pt will be able to negotiate stairs reciprocally and independently to allow pt to be able to perform ADLs    Pt will improve strength of L knee and hip to at least 4+/5 to allow pt to be able to perform ADLs    Pt will improve score on LEFS by at least 10 points to allow pt to be able to perform ADLs    Pt will " continue to report minimal to no pain to allow pt to be able to perform ADLs

## 2025-06-25 ENCOUNTER — TREATMENT (OUTPATIENT)
Dept: PHYSICAL THERAPY | Facility: CLINIC | Age: 80
End: 2025-06-25
Payer: MEDICARE

## 2025-06-25 DIAGNOSIS — M17.12 UNILATERAL PRIMARY OSTEOARTHRITIS, LEFT KNEE: ICD-10-CM

## 2025-06-25 PROCEDURE — 97110 THERAPEUTIC EXERCISES: CPT | Mod: GP

## 2025-06-25 NOTE — PROGRESS NOTES
"Physical Therapy Treatment    Patient Name: Lydia Limon  MRN: [id number]  Today's Date: 6/25/2025       Insurance:  Visit number: 9 of 13  Authorization info: 1) MEDICARE A/B -  NO AUTH / $257 DED MET / 80% Coins / [place] visits / $93.88 used 2025 PT/ST.   2) MMO - NO AUTH / 100% COVERAGE thru 12/31/25 / $250 DED MET / reverify 1/1/26 - 80% COINS / $1500 OOP MET / 20V - 0 USED / Availity [phone] / ds 5/21/25 //   Dry Needling not covered.  Infrared Light - Req auth //         Insurance Type: Payor: MEDICARE / Plan: MEDICARE PART A AND B / Product Type: *No Product type* /     Current Problem   1. Unilateral primary osteoarthritis, left knee  Follow Up In Physical Therapy          Subjective   General    Pt reported stiffness in her knee. Still has trouble with stairs and pain, but is performing HEP  Precautions:L TKA performed 4/2/25, osteoporosis, [place]      Pain    \"Stiff\"  Post Treatment Pain Level HS soreness     Objective   R knee flex on step: 107    Treatments:  Therapeutic Exercise:   Nustep L5, 6'  Side steps: x8 yellow  Monster walk forward/retro: x4 yellow  Steps in clinic: x6  Bike 3 holes showing: x3mins   Leg extension: x30 16lbs SL  HS curls B: 2x10 23lbs   TKE: x15 yellow     Assessment   Assessment:    R knee AROM as above. Expect stair negotiation tolerance to improve with continued strengthening and improvement of R knee flexion    Plan:    Progress strengthening and improving AROM      OP EDUCATION:   Pt educated on technique, HEP, improving knee flexion     Goals:   Active       PT Problem       PT Goal 1       Start:  05/22/25    Expected End:  06/12/25       Pt will be independent with HEP to ensure benefit outside of PT         PT Goal 2       Start:  05/22/25    Expected End:  07/03/25       Pt will improve R knee flexion to at least 110 degrees to allow pt to be able to perform ADLs    Pt will be able to negotiate stairs reciprocally and independently to allow pt to be able to perform " ADLs    Pt will improve strength of L knee and hip to at least 4+/5 to allow pt to be able to perform ADLs    Pt will improve score on LEFS by at least 10 points to allow pt to be able to perform ADLs    Pt will continue to report minimal to no pain to allow pt to be able to perform ADLs

## 2025-06-30 ENCOUNTER — TREATMENT (OUTPATIENT)
Dept: PHYSICAL THERAPY | Facility: CLINIC | Age: 80
End: 2025-06-30
Payer: MEDICARE

## 2025-06-30 DIAGNOSIS — M17.12 UNILATERAL PRIMARY OSTEOARTHRITIS, LEFT KNEE: ICD-10-CM

## 2025-06-30 PROCEDURE — 97110 THERAPEUTIC EXERCISES: CPT | Mod: GP

## 2025-06-30 NOTE — PROGRESS NOTES
"Physical Therapy Treatment    Patient Name: Lydia Limon  MRN: [id number]  Today's Date: 6/30/2025  Time Calculation  Start Time: 1035  Stop Time: 1119  Time Calculation (min): 44 min  PT Therapeutic Procedures Time Entry  Therapeutic Exercise Time Entry: 44    Insurance:  Visit number: 10 of 13  Authorization info: 1) MEDICARE A/B -  NO AUTH / $257 DED MET / 80% Coins / [place] visits / $93.88 used 2025 PT/ST.  2) MMO - NO AUTH / 100% COVERAGE thru 12/31/25 / $250 DED MET / reverify 1/1/26 - 80% COINS / $1500 OOP MET / 20V - 0 USED / Availity [phone] / ds 5/21/25 //  Dry Needling not covered.  Infrared Light - Req auth //        Insurance Type: Payor: MEDICARE / Plan: MEDICARE PART A AND B / Product Type: *No Product type* /     Current Problem   1. Unilateral primary osteoarthritis, left knee  Follow Up In Physical Therapy          Subjective   General    Pt reported that she has been compliant with HEP. She still struggles with stairs.   Precautions:L TKA performed 4/2/25, osteoporosis, [place]      Pain    \"A little\" pt did not rate  Post Treatment Pain Level no increase    Objective   L knee ext: 2  L knee flex: 107    Treatments:  Therapeutic Exercise:   Nustep L5, 6'  STS: x10  Leg press: x15 60lbs B; x15 70lbs B  Steps in clinic: x5- B knee valgus noted   Leg extension: 3x10 20lbs SL  HS curls B: 3x10 26lbs   SLR: x15  S/L hip abduction: x15  Prone quad stretch: [id number]   Assessment   Assessment:    Pt demonstrated knee motion as above. B knee valgus noted with descending stairs, greater on R vs L. Recommend continuing to progress improving knee ROM, but strengthening as well to prevent knee valgus     Plan:    Progress as able     OP EDUCATION:   Pt educated on technique, HEP-given handout, improving knee motion  Access Code: [email or web address]  URL: [email or web address]  Date: 06/30/2025  Prepared by: Bartolo Goncalves    Exercises  - Seated Quad Set  - 1-6 x daily - 7 x weekly - 1-2 sets - 10 reps  - " Standing Knee Flexion Stretch on Step  - 1-6 x daily - 7 x weekly - 1-2 sets - 10 reps - 5seconds hold  - Sitting Heel Slide with Towel  - 1-6 x daily - 7 x weekly - 3 sets - 10 reps - 5seconds hold  - Seated Knee Extension with Resistance  - 1 x daily - 7 x weekly - 1-2 sets - 10 reps  - Sidelying Hip Abduction  - 1 x daily - 7 x weekly - 1-2 sets - 10 reps  - Sit to Stand with Arms Crossed  - 1 x daily - 7 x weekly - 1-2 sets - 10 reps  - Active Straight Leg Raise with Quad Set  - 1 x daily - 7 x weekly - 1-2 sets - 10 reps  - Prone Quadriceps Stretch with Strap  - 1-3 x daily - 7 x weekly - 3-5 sets - 15-30seconds hold  - Seated Knee Extension Stretch with Chair  - 1-6 x daily - 7 x weekly - 3 sets - 15-30seconds hold  Goals:   Active       PT Problem       PT Goal 1       Start:  05/22/25    Expected End:  06/12/25       Pt will be independent with HEP to ensure benefit outside of PT         PT Goal 2       Start:  05/22/25    Expected End:  07/03/25       Pt will improve R knee flexion to at least 110 degrees to allow pt to be able to perform ADLs    Pt will be able to negotiate stairs reciprocally and independently to allow pt to be able to perform ADLs    Pt will improve strength of L knee and hip to at least 4+/5 to allow pt to be able to perform ADLs    Pt will improve score on LEFS by at least 10 points to allow pt to be able to perform ADLs    Pt will continue to report minimal to no pain to allow pt to be able to perform ADLs

## 2025-07-02 ENCOUNTER — TREATMENT (OUTPATIENT)
Dept: PHYSICAL THERAPY | Facility: CLINIC | Age: 80
End: 2025-07-02
Payer: MEDICARE

## 2025-07-02 DIAGNOSIS — M17.12 UNILATERAL PRIMARY OSTEOARTHRITIS, LEFT KNEE: ICD-10-CM

## 2025-07-02 PROCEDURE — 97110 THERAPEUTIC EXERCISES: CPT | Mod: GP

## 2025-07-02 NOTE — PROGRESS NOTES
Physical Therapy Treatment/POC extension    Patient Name: Lydia Limon  MRN: [id number]  Today's Date: 7/2/2025  Time Calculation  Start Time: 1034  Stop Time: 1114  Time Calculation (min): 40 min  PT Therapeutic Procedures Time Entry  Therapeutic Exercise Time Entry: 35  Manual Therapy Time Entry: 5    Insurance:  Visit number: 11 of 13  Authorization info: 1) MEDICARE A/B -  NO AUTH / $257 DED MET / 80% Coins / [place] visits / $93.88 used 2025 PT/ST.   2) MMO - NO AUTH / 100% COVERAGE thru 12/31/25 / $250 DED MET / reverify 1/1/26 - 80% COINS / $1500 OOP MET / 20V - 0 USED / Availity [phone] / ds 5/21/25 //   Dry Needling not covered.  Infrared Light - Req auth //         Insurance Type: Payor: MEDICARE / Plan: MEDICARE PART A AND B / Product Type: *No Product type* /     Current Problem   1. Unilateral primary osteoarthritis, left knee  Follow Up In Physical Therapy          Subjective   General    Pt reported that she has been compliant with HEP. She feels stiff in the mornings. Pt has difficulty with new prone stretch due to pain with her back   Precautions:L TKA performed 4/2/25, osteoporosis, [place]      Pain    0/10  Post Treatment Pain Level no increase    Objective   R knee flex: 110 PROM    Treatments:  Therapeutic Exercise:    Nustep L6, 6'  STS: x10  Leg press:  x30 80lbs B- pt reluctant to increase weight  Steps ups forward/lateral: x15 6in   Leg extension: 2x15 23lbs SL  HS curls B: 2x15 30lbs     Manual:  Prone knee flexion to pt tolerance with 15-20second holds     Assessment   Assessment:    Pt demonstrated knee flexion PROM as above. Pain towards end range    Plan:    Extend POC for 2 more visit due to not completing original POC    OP EDUCATION:   Pt educated on technique, HEP-given handout, discussed perform knee flexion in standing/long sitting instead of prone if painful for back  Access Code: [email or web address]  URL: [email or web address]  Date: 07/02/2025  Prepared by: Bartolo  Goncalves    Exercises  - Seated Quad Set  - 1-6 x daily - 7 x weekly - 1-2 sets - 10 reps  - Standing Knee Flexion Stretch on Step  - 1-6 x daily - 7 x weekly - 1-2 sets - 10 reps - 30seconds hold  - Sitting Heel Slide with Towel  - 1-6 x daily - 7 x weekly - 3 sets - 10 reps - 30seconds hold  - Seated Knee Extension with Resistance  - 1 x daily - 7 x weekly - 1-2 sets - 10 reps  - Sidelying Hip Abduction  - 1 x daily - 7 x weekly - 1-2 sets - 10 reps  - Sit to Stand with Arms Crossed  - 1 x daily - 7 x weekly - 1-2 sets - 10 reps  - Active Straight Leg Raise with Quad Set  - 1 x daily - 7 x weekly - 1-2 sets - 10 reps  - Prone Quadriceps Stretch with Strap  - 1-3 x daily - 7 x weekly - 3-5 sets - 15-30seconds hold  - Seated Knee Extension Stretch with Chair  - 1-6 x daily - 7 x weekly - 3 sets - 15-30seconds hold  Goals:   Active       PT Problem       PT Goal 1 (Met)       Start:  05/22/25    Expected End:  06/12/25    Resolved:  07/02/25    Pt will be independent with HEP to ensure benefit outside of PT      Goal Note       Pt reported that she has been stretching 3-4 times per day              PT Goal 2       Start:  05/22/25    Expected End:  07/03/25       Pt will improve R knee flexion to at least 110 degrees to allow pt to be able to perform ADLs    Pt will be able to negotiate stairs reciprocally and independently to allow pt to be able to perform ADLs    Pt will improve strength of L knee and hip to at least 4+/5 to allow pt to be able to perform ADLs    Pt will improve score on LEFS by at least 10 points to allow pt to be able to perform ADLs    Pt will continue to report minimal to no pain to allow pt to be able to perform ADLs

## 2025-07-07 ENCOUNTER — TREATMENT (OUTPATIENT)
Dept: PHYSICAL THERAPY | Facility: CLINIC | Age: 80
End: 2025-07-07
Payer: MEDICARE

## 2025-07-07 DIAGNOSIS — M17.12 UNILATERAL PRIMARY OSTEOARTHRITIS, LEFT KNEE: ICD-10-CM

## 2025-07-07 PROCEDURE — 97110 THERAPEUTIC EXERCISES: CPT | Mod: GP

## 2025-07-07 NOTE — PROGRESS NOTES
"Physical Therapy Treatment    Patient Name: Lydia Limon  MRN: [id number]  Today's Date: 2025  Time Calculation  Start Time: 1032  Stop Time: 1111  Time Calculation (min): 39 min  PT Therapeutic Procedures Time Entry  Therapeutic Exercise Time Entry: 39    Insurance:  Visit number:   Authorization info: 1) MEDICARE A/B -  NO AUTH / $257 DED MET / 80% Coins / [place] visits / $93.88 used  PT/ST.   2) MMO - NO AUTH / 100% COVERAGE thru 25 / $250 DED MET / reverify 26 - 80% COINS / $1500 OOP MET / 20V - 0 USED / Availity [phone] / ds 25 //   Dry Needling not covered.  Infrared Light - Req auth //   Insurance Type: Payor: MEDICARE / Plan: MEDICARE PART A AND B / Product Type: *No Product type* /     Current Problem   1. Unilateral primary osteoarthritis, left knee  Follow Up In Physical Therapy          Subjective   General    Pt reported that she still gets stiff in the morning. She does notice improvement. Still has trouble with stairs   Precautions::L TKA performed 25, osteoporosis, [place]      Pain    \"Stiff\"   Post Treatment Pain Level improved symptoms     Objective   R knee flex PROM: 113    Treatments:  Therapeutic Exercise:    Nustep L5, 5min  Leg press:  x15 100lbs B; x15 B 110lbs   Stairs in clinic with reciprocal pattern: x8   Leg extension: 2x15 23lbs SL  HS curls B: 2x15 30lbs   SLR: x15  S/l hip abduction: x15 B  Bridges with abduction red: x15  Bike with 5 holes showinmins        Assessment   Assessment:    Pt demonstrated R knee PROM as above. She still demonstrated B knee valgus at times during session and given verbal cues to correct.     Plan:    Re-check next session    OP EDUCATION:   Pt educated on technique, POC, continuing HEP    Goals:   Active       PT Problem       PT Goal 1 (Met)       Start:  25    Expected End:  25    Resolved:  25    Pt will be independent with HEP to ensure benefit outside of PT      Goal Note       Pt reported " that she has been stretching 3-4 times per day              PT Goal 2       Start:  05/22/25    Expected End:  07/09/25       Pt will improve R knee flexion to at least 110 degrees to allow pt to be able to perform ADLs    Pt will be able to negotiate stairs reciprocally and independently to allow pt to be able to perform ADLs    Pt will improve strength of L knee and hip to at least 4+/5 to allow pt to be able to perform ADLs    Pt will improve score on LEFS by at least 10 points to allow pt to be able to perform ADLs    Pt will continue to report minimal to no pain to allow pt to be able to perform ADLs

## 2025-07-09 ENCOUNTER — TREATMENT (OUTPATIENT)
Dept: PHYSICAL THERAPY | Facility: CLINIC | Age: 80
End: 2025-07-09
Payer: MEDICARE

## 2025-07-09 DIAGNOSIS — M17.12 UNILATERAL PRIMARY OSTEOARTHRITIS, LEFT KNEE: ICD-10-CM

## 2025-07-09 PROCEDURE — 97530 THERAPEUTIC ACTIVITIES: CPT | Mod: GP

## 2025-07-09 NOTE — PROGRESS NOTES
"Physical Therapy discharge Report    Patient Name: Lydia Limon  MRN: [id number]  Encounter date: 7/9/2025    Time Calculation  Start Time: 1033  Stop Time: 1059  Time Calculation (min): 26 min  PT Therapeutic Procedures Time Entry  Therapeutic Activity Time Entry: 26    Visit # 13 of 13  Visits/Dates Authorized: 1) MEDICARE A/B -  NO AUTH / $257 DED MET / 80% Coins / [place] visits / $93.88 used 2025 PT/ST.   2) MMO - NO AUTH / 100% COVERAGE thru 12/31/25 / $250 DED MET / reverify 1/1/26 - 80% COINS / $1500 OOP MET / 20V - 0 USED / Availity [id number] / ds 5/21/25 //   Dry Needling not covered.  Infrared Light - Req auth //           Current Problem:  1. Unilateral primary osteoarthritis, left knee  Follow Up In Physical Therapy        Surgery: L TKA  Surgery date: 4/2/25    Precautions: L TKA performed 4/2/25, osteoporosis, [place]           Subjective   General   Pt reported that she still notices stiffness in the morning or sitting the car for too long. She still struggles with stairs, but is noticing improvement. Pt is compliant with HEP and believes she will continue to improve as long as she continues with her HEP. Pt feels comfortable with continuing HEP on her own and wishes to be discharged    Pre-Treatment Symptoms: \"stiff\"          Objective           Outcome Measures:  Other Measures  Lower Extremity Funtional Score (LEFS): 63/80  ROM   L knee flex: 111; L knee ext: 1      MMT   L hip flex: 4+/5; L hip abd: 4+/5; L hip ext: 4+/5  L knee ext/flex: 5/5    Stairs   Able to negotiate stairs independently with B railing; without railing is slow and noticeable L hip drop and B knee valgus       Outcome Measures:  Other Measures  Lower Extremity Funtional Score (LEFS): 63/80    Treatments:   Therapeutic activity:  Discharge summary- objective measures taken as above, along with discussion of POC, assessment, continuing with HEP      Assessment:    Pt demonstrated L hip and knee strength as above. L knee flexion " at 111, but R knee flexion was not up to 120 as indicated on IE. Pt would like to continue with HEP on her to improve knee flexion and stair negotiation as best as she can independently and would like to be discharged     Post-Treatment Symptoms:    No reported pain    Plan:    Discharge  Pt educated on HEP-given handout, POC, assessment, continuing to progress strengthening and improving motion  Access Code: [email or web address]  URL: [email or web address]  Date: 07/09/2025  Prepared by: Bartolo Goncalves    Exercises  - Seated Quad Set  - 1-6 x daily - 7 x weekly - 1-2 sets - 10 reps  - Standing Knee Flexion Stretch on Step  - 1-6 x daily - 7 x weekly - 1-2 sets - 10 reps - 30seconds hold  - Sitting Heel Slide with Towel  - 1-6 x daily - 7 x weekly - 3 sets - 10 reps - 30seconds hold  - Prone Quadriceps Stretch with Strap  - 1-3 x daily - 7 x weekly - 3-5 sets - 15-30seconds hold  - Sidelying Hip Abduction  - 1 x daily - 7 x weekly - 1-2 sets - 10 reps  - Active Straight Leg Raise with Quad Set  - 1 x daily - 7 x weekly - 1-2 sets - 10 reps  - Seated Knee Extension Stretch with Chair  - 1-6 x daily - 7 x weekly - 3 sets - 15-30seconds hold  - Seated Knee Extension with Resistance  - 1 x daily - 7 x weekly - 1-2 sets - 10 reps  - Squat with Chair Touch and Resistance Loop  - 1 x daily - 7 x weekly - 3 sets - 10 reps  - Seated Hamstring Curls with Resistance  - 1 x daily - 7 x weekly - 3 sets - 10 reps  - Hip Abduction with Resistance Loop  - 1 x daily - 7 x weekly - 3 sets - 10 reps  - Hip Extension with Resistance Loop  - 1 x daily - 7 x weekly - 3 sets - 10 reps  - Standing Hip Flexion with Resistance Loop  - 1 x daily - 7 x weekly - 3 sets - 10 reps  - Forward Step Up with Counter Support  - 1 x daily - 7 x weekly - 3 sets - 10 reps  Goals:   Resolved       PT Problem       PT Goal 1 (Met)       Start:  05/22/25    Expected End:  06/12/25    Resolved:  07/02/25    Pt will be independent with HEP to ensure benefit  outside of PT      Goal Note       Pt reported that she has been stretching 3-4 times per day              PT Goal 2 (Met)       Start:  05/22/25    Expected End:  07/09/25    Resolved:  07/09/25    Pt will improve L knee flexion to at least 110 degrees to allow pt to be able to perform ADLs    Pt will be able to negotiate stairs reciprocally and independently to allow pt to be able to perform ADLs    Pt will improve strength of L knee and hip to at least 4+/5 to allow pt to be able to perform ADLs    Pt will improve score on LEFS by at least 10 points to allow pt to be able to perform ADLs    Pt will continue to report minimal to no pain to allow pt to be able to perform ADLs        Goal Note       L knee flex: 111    Able to negotiate stairs independently with B railing    L hip flex: 4+/5; L hip abd: 4+/5; L hip ext: 4+/5  L knee ext/flex: 5/5    Pt denied pain; only stiffness

## 2025-07-24 DIAGNOSIS — Z47.1 AFTERCARE FOLLOWING LEFT KNEE JOINT REPLACEMENT SURGERY: ICD-10-CM

## 2025-07-24 DIAGNOSIS — Z96.652 AFTERCARE FOLLOWING LEFT KNEE JOINT REPLACEMENT SURGERY: ICD-10-CM

## 2025-07-24 RX ORDER — AMOXICILLIN 500 MG/1
2000 CAPSULE ORAL ONCE AS NEEDED
Qty: 4 CAPSULE | Refills: 1 | Status: SHIPPED | OUTPATIENT
Start: 2025-07-24

## 2025-08-12 ENCOUNTER — DOCUMENTATION (OUTPATIENT)
Dept: PHYSICAL THERAPY | Facility: CLINIC | Age: 80
End: 2025-08-12
Payer: MEDICARE

## (undated) DEVICE — GLOVE, SURGICAL, PROTEXIS PI , 7.5, PF, LF

## (undated) DEVICE — HOOD, SURGICAL, FLYTE HYBRID

## (undated) DEVICE — Device

## (undated) DEVICE — DRAPE, SHEET, U, W/ADHESIVE STRIP, IMPERVIOUS, 60 X 70 IN, DISPOSABLE, LF, STERILE

## (undated) DEVICE — TUBING, IRRIGATION, HIGH FLOW, HAND PIECE SET

## (undated) DEVICE — GLOVE, SURGICAL, PROTEXIS PI , 6.5, PF, LF

## (undated) DEVICE — STRIP, SKIN CLOSURE, STERI STRIP, REINFORCED, 0.5 X 4 IN

## (undated) DEVICE — BLADE, SAW, DUAL SAGITTAL, 1.9 X 90 X 30

## (undated) DEVICE — SYRINGE, 50 CC, LUER LOCK

## (undated) DEVICE — CUFF, TOURNIQUET, 30 X 4, DUAL PORT/SNGL BLADDER, DISP, LF

## (undated) DEVICE — DRESSING, MEPILEX, BORDER LIGHT, 3 X 3 IN

## (undated) DEVICE — DRAPE, IRRIGATION, W/POUCH, ADHESIVE STRIP, STERI DRAPE, 19 X 23 IN, DISPOSABLE, STERILE

## (undated) DEVICE — DRESSING, MEPILEX BORDER, POST-OP AG, 4 X 12 IN

## (undated) DEVICE — GLOVE, SURGICAL, PROTEXIS PI BLUE W/NEUTHERA, 7.0, PF, LF

## (undated) DEVICE — DRAPE, ISOLATION, INCISE, W/POUCH, STERI DRAPE, 125 X 83 IN, DISPOSABLE, STERILE

## (undated) DEVICE — DRAIN, WOUND, ROUND, W/TROCAR, HOLE PATTERN, 10 IN, MEDIUM/LARGE, 3/16 X 49 IN

## (undated) DEVICE — DRAPE, INCISE, STERI DRAPE, LARGE, 23 X 17 IN, DISPOSABLE, STERILE

## (undated) DEVICE — 83.75IN X 126IN STERI-DRAPE, ISOLATION POUCH W/DRAIN, ADH WINDOW

## (undated) DEVICE — CEMENT, MIXEVAC III, 10S BOWL, KNEES

## (undated) DEVICE — NEEDLE, SPINAL, QUINCKE, 18 G X 3.5 IN, PINK HUB

## (undated) DEVICE — SOLUTION, INJECTION, 0.9% SODIUM CHL, USP LIFECARE 1000 MI

## (undated) DEVICE — GLOVE, SURGICAL, PROTEXIS PI ORTHO, 8.0, PF, LF

## (undated) DEVICE — DRESSING, TRANSPARENT, TEGADERM, 4 X 4.5

## (undated) DEVICE — SUTURE, VICRYL, 0, 18 IN, CT-1, UNDYED

## (undated) DEVICE — SUTURE, VICRYL, 2-0, 18 IN CP-2, UNDYED

## (undated) DEVICE — SYRINGE, 60 CC, IRRIGATION, BULB, CONTRO-BULB, PAPER POUCH

## (undated) DEVICE — CHANNEL DRAIN, BARD, 15FR, ROUND HUBLESS, FULL FLUTED

## (undated) DEVICE — GLOVE, SURGICAL, PROTEXIS PI , 8.0, PF, LF

## (undated) DEVICE — 10IN STRYKER SMOKE EVACUATION TUBING

## (undated) DEVICE — SUTURE, MONOCRYL, 3-0, PS-1 27IN, UNDYED

## (undated) DEVICE — SUTURE, ETHIBOND, P2, V-37, 30 IN, GREEN